# Patient Record
Sex: FEMALE | Race: WHITE
[De-identification: names, ages, dates, MRNs, and addresses within clinical notes are randomized per-mention and may not be internally consistent; named-entity substitution may affect disease eponyms.]

---

## 2017-12-06 ENCOUNTER — TRANSCRIPTION ENCOUNTER (OUTPATIENT)
Age: 82
End: 2017-12-06

## 2017-12-07 PROBLEM — Z00.00 ENCOUNTER FOR PREVENTIVE HEALTH EXAMINATION: Status: ACTIVE | Noted: 2017-12-07

## 2017-12-15 ENCOUNTER — APPOINTMENT (OUTPATIENT)
Dept: DERMATOLOGY | Facility: CLINIC | Age: 82
End: 2017-12-15
Payer: MEDICARE

## 2017-12-15 VITALS — HEIGHT: 63 IN | BODY MASS INDEX: 22.15 KG/M2 | WEIGHT: 125 LBS

## 2017-12-15 DIAGNOSIS — Z84.0 FAMILY HISTORY OF DISEASES OF THE SKIN AND SUBCUTANEOUS TISSUE: ICD-10-CM

## 2017-12-15 PROCEDURE — 99213 OFFICE O/P EST LOW 20 MIN: CPT

## 2018-12-12 ENCOUNTER — APPOINTMENT (OUTPATIENT)
Dept: DERMATOLOGY | Facility: CLINIC | Age: 83
End: 2018-12-12

## 2019-08-30 ENCOUNTER — APPOINTMENT (OUTPATIENT)
Dept: DERMATOLOGY | Facility: CLINIC | Age: 84
End: 2019-08-30
Payer: MEDICARE

## 2019-08-30 DIAGNOSIS — L40.9 PSORIASIS, UNSPECIFIED: ICD-10-CM

## 2019-08-30 DIAGNOSIS — L82.1 OTHER SEBORRHEIC KERATOSIS: ICD-10-CM

## 2019-08-30 PROCEDURE — 99213 OFFICE O/P EST LOW 20 MIN: CPT

## 2019-08-30 NOTE — HISTORY OF PRESENT ILLNESS
[de-identified] : Pt. presents for skin check;\par No itching, bleeding, growing, changing lesions noted; \par Also;  lifelong history of psoriasis;  currently doing well on topicals

## 2019-08-30 NOTE — ASSESSMENT
[FreeTextEntry1] : skin examination is negative for malignancy;\par Continue regular exams;\par \par Psoriasis doing well;  renew calcipotriene cream\par Follow up for TBSE in 1 year \par

## 2019-08-30 NOTE — PHYSICAL EXAM
[Full Body Skin Exam Performed] : performed [FreeTextEntry3] : Skin examination performed of the face, neck, chest, hands, lower legs;\par The patient is well, alert and oriented, pleasant and cooperative.\par Eyelids, conjunctivae, oral mucosa, digits and nails all normal.  \par No cervical adenopathy.\par \par  Normal findings include:\par \par Seborrheic keratoses\par Angiomas\par Lentigines\par \par No lesions were suspicious for malignancy. \par \par Erythematous scaling plaques with adherent scale; limited to elbows, also scalp

## 2019-12-25 ENCOUNTER — EMERGENCY (EMERGENCY)
Facility: HOSPITAL | Age: 84
LOS: 0 days | Discharge: HOME | End: 2019-12-25
Attending: EMERGENCY MEDICINE
Payer: MEDICARE

## 2019-12-25 VITALS
WEIGHT: 119.93 LBS | OXYGEN SATURATION: 98 % | DIASTOLIC BLOOD PRESSURE: 71 MMHG | TEMPERATURE: 98 F | SYSTOLIC BLOOD PRESSURE: 147 MMHG | RESPIRATION RATE: 17 BRPM | HEART RATE: 98 BPM | HEIGHT: 63 IN

## 2019-12-25 VITALS
DIASTOLIC BLOOD PRESSURE: 68 MMHG | OXYGEN SATURATION: 96 % | HEART RATE: 88 BPM | SYSTOLIC BLOOD PRESSURE: 146 MMHG | RESPIRATION RATE: 18 BRPM | TEMPERATURE: 98 F

## 2019-12-25 DIAGNOSIS — Z88.0 ALLERGY STATUS TO PENICILLIN: ICD-10-CM

## 2019-12-25 DIAGNOSIS — R06.02 SHORTNESS OF BREATH: ICD-10-CM

## 2019-12-25 DIAGNOSIS — R91.1 SOLITARY PULMONARY NODULE: ICD-10-CM

## 2019-12-25 DIAGNOSIS — Z79.01 LONG TERM (CURRENT) USE OF ANTICOAGULANTS: ICD-10-CM

## 2019-12-25 DIAGNOSIS — I25.10 ATHEROSCLEROTIC HEART DISEASE OF NATIVE CORONARY ARTERY WITHOUT ANGINA PECTORIS: ICD-10-CM

## 2019-12-25 DIAGNOSIS — J40 BRONCHITIS, NOT SPECIFIED AS ACUTE OR CHRONIC: ICD-10-CM

## 2019-12-25 DIAGNOSIS — Z85.3 PERSONAL HISTORY OF MALIGNANT NEOPLASM OF BREAST: ICD-10-CM

## 2019-12-25 DIAGNOSIS — Z88.1 ALLERGY STATUS TO OTHER ANTIBIOTIC AGENTS STATUS: ICD-10-CM

## 2019-12-25 DIAGNOSIS — I48.91 UNSPECIFIED ATRIAL FIBRILLATION: ICD-10-CM

## 2019-12-25 DIAGNOSIS — I10 ESSENTIAL (PRIMARY) HYPERTENSION: ICD-10-CM

## 2019-12-25 DIAGNOSIS — R05 COUGH: ICD-10-CM

## 2019-12-25 DIAGNOSIS — R06.2 WHEEZING: ICD-10-CM

## 2019-12-25 LAB
ALBUMIN SERPL ELPH-MCNC: 3.5 G/DL — SIGNIFICANT CHANGE UP (ref 3.3–5)
ALP SERPL-CCNC: 89 U/L — SIGNIFICANT CHANGE UP (ref 40–120)
ALT FLD-CCNC: 29 U/L — SIGNIFICANT CHANGE UP (ref 12–78)
ANION GAP SERPL CALC-SCNC: 7 MMOL/L — SIGNIFICANT CHANGE UP (ref 5–17)
APTT BLD: 68.2 SEC — HIGH (ref 27.5–36.3)
AST SERPL-CCNC: 28 U/L — SIGNIFICANT CHANGE UP (ref 15–37)
BASOPHILS # BLD AUTO: 0.05 K/UL — SIGNIFICANT CHANGE UP (ref 0–0.2)
BASOPHILS NFR BLD AUTO: 0.5 % — SIGNIFICANT CHANGE UP (ref 0–2)
BILIRUB SERPL-MCNC: 0.4 MG/DL — SIGNIFICANT CHANGE UP (ref 0.2–1.2)
BUN SERPL-MCNC: 14 MG/DL — SIGNIFICANT CHANGE UP (ref 7–23)
CALCIUM SERPL-MCNC: 9.2 MG/DL — SIGNIFICANT CHANGE UP (ref 8.5–10.1)
CHLORIDE SERPL-SCNC: 103 MMOL/L — SIGNIFICANT CHANGE UP (ref 96–108)
CO2 SERPL-SCNC: 26 MMOL/L — SIGNIFICANT CHANGE UP (ref 22–31)
CREAT SERPL-MCNC: 0.86 MG/DL — SIGNIFICANT CHANGE UP (ref 0.5–1.3)
EOSINOPHIL # BLD AUTO: 0.07 K/UL — SIGNIFICANT CHANGE UP (ref 0–0.5)
EOSINOPHIL NFR BLD AUTO: 0.7 % — SIGNIFICANT CHANGE UP (ref 0–6)
GLUCOSE SERPL-MCNC: 115 MG/DL — HIGH (ref 70–99)
HCT VFR BLD CALC: 42.8 % — SIGNIFICANT CHANGE UP (ref 34.5–45)
HGB BLD-MCNC: 13.8 G/DL — SIGNIFICANT CHANGE UP (ref 11.5–15.5)
IMM GRANULOCYTES NFR BLD AUTO: 0.3 % — SIGNIFICANT CHANGE UP (ref 0–1.5)
INR BLD: 2.5 RATIO — HIGH (ref 0.88–1.16)
LYMPHOCYTES # BLD AUTO: 1.12 K/UL — SIGNIFICANT CHANGE UP (ref 1–3.3)
LYMPHOCYTES # BLD AUTO: 11.6 % — LOW (ref 13–44)
MCHC RBC-ENTMCNC: 29.4 PG — SIGNIFICANT CHANGE UP (ref 27–34)
MCHC RBC-ENTMCNC: 32.2 GM/DL — SIGNIFICANT CHANGE UP (ref 32–36)
MCV RBC AUTO: 91.1 FL — SIGNIFICANT CHANGE UP (ref 80–100)
MONOCYTES # BLD AUTO: 1.15 K/UL — HIGH (ref 0–0.9)
MONOCYTES NFR BLD AUTO: 11.9 % — SIGNIFICANT CHANGE UP (ref 2–14)
NEUTROPHILS # BLD AUTO: 7.26 K/UL — SIGNIFICANT CHANGE UP (ref 1.8–7.4)
NEUTROPHILS NFR BLD AUTO: 75 % — SIGNIFICANT CHANGE UP (ref 43–77)
PLATELET # BLD AUTO: 273 K/UL — SIGNIFICANT CHANGE UP (ref 150–400)
POTASSIUM SERPL-MCNC: 4.3 MMOL/L — SIGNIFICANT CHANGE UP (ref 3.5–5.3)
POTASSIUM SERPL-SCNC: 4.3 MMOL/L — SIGNIFICANT CHANGE UP (ref 3.5–5.3)
PROT SERPL-MCNC: 7.2 GM/DL — SIGNIFICANT CHANGE UP (ref 6–8.3)
PROTHROM AB SERPL-ACNC: 28.5 SEC — HIGH (ref 10–12.9)
RAPID RVP RESULT: SIGNIFICANT CHANGE UP
RBC # BLD: 4.7 M/UL — SIGNIFICANT CHANGE UP (ref 3.8–5.2)
RBC # FLD: 13.5 % — SIGNIFICANT CHANGE UP (ref 10.3–14.5)
SODIUM SERPL-SCNC: 136 MMOL/L — SIGNIFICANT CHANGE UP (ref 135–145)
TROPONIN I SERPL-MCNC: <0.015 NG/ML — SIGNIFICANT CHANGE UP (ref 0.01–0.04)
WBC # BLD: 9.68 K/UL — SIGNIFICANT CHANGE UP (ref 3.8–10.5)
WBC # FLD AUTO: 9.68 K/UL — SIGNIFICANT CHANGE UP (ref 3.8–10.5)

## 2019-12-25 PROCEDURE — 71045 X-RAY EXAM CHEST 1 VIEW: CPT | Mod: 26

## 2019-12-25 PROCEDURE — 99284 EMERGENCY DEPT VISIT MOD MDM: CPT

## 2019-12-25 PROCEDURE — 85730 THROMBOPLASTIN TIME PARTIAL: CPT

## 2019-12-25 PROCEDURE — 94640 AIRWAY INHALATION TREATMENT: CPT

## 2019-12-25 PROCEDURE — 85025 COMPLETE CBC W/AUTO DIFF WBC: CPT

## 2019-12-25 PROCEDURE — 87633 RESP VIRUS 12-25 TARGETS: CPT

## 2019-12-25 PROCEDURE — 93010 ELECTROCARDIOGRAM REPORT: CPT

## 2019-12-25 PROCEDURE — 80053 COMPREHEN METABOLIC PANEL: CPT

## 2019-12-25 PROCEDURE — 93005 ELECTROCARDIOGRAM TRACING: CPT

## 2019-12-25 PROCEDURE — 87581 M.PNEUMON DNA AMP PROBE: CPT

## 2019-12-25 PROCEDURE — 84484 ASSAY OF TROPONIN QUANT: CPT

## 2019-12-25 PROCEDURE — 85610 PROTHROMBIN TIME: CPT

## 2019-12-25 PROCEDURE — 87798 DETECT AGENT NOS DNA AMP: CPT

## 2019-12-25 PROCEDURE — 36415 COLL VENOUS BLD VENIPUNCTURE: CPT

## 2019-12-25 PROCEDURE — 99284 EMERGENCY DEPT VISIT MOD MDM: CPT | Mod: 25

## 2019-12-25 PROCEDURE — 87486 CHLMYD PNEUM DNA AMP PROBE: CPT

## 2019-12-25 PROCEDURE — 71045 X-RAY EXAM CHEST 1 VIEW: CPT

## 2019-12-25 RX ORDER — IPRATROPIUM/ALBUTEROL SULFATE 18-103MCG
3 AEROSOL WITH ADAPTER (GRAM) INHALATION ONCE
Refills: 0 | Status: COMPLETED | OUTPATIENT
Start: 2019-12-25 | End: 2019-12-25

## 2019-12-25 RX ORDER — ALBUTEROL 90 UG/1
2 AEROSOL, METERED ORAL ONCE
Refills: 0 | Status: COMPLETED | OUTPATIENT
Start: 2019-12-25 | End: 2019-12-25

## 2019-12-25 RX ADMIN — Medication 3 MILLILITER(S): at 18:26

## 2019-12-25 RX ADMIN — ALBUTEROL 2 PUFF(S): 90 AEROSOL, METERED ORAL at 22:03

## 2019-12-25 NOTE — ED PROVIDER NOTE - OBJECTIVE STATEMENT
89 y/o female with PMHx of breast CA, CAD, Afib on Coumadin, HTN, hypercalcemia presents to the ED c/o SOB beginning this AM. +wheeze, congestion, productive cough. Denies fever, chills, vomiting, diarrhea, CP. Pt placed on O2 2L NC which offered relief. +sick contacts; spent time with granddaughter with croup last week.

## 2019-12-25 NOTE — ED PROVIDER NOTE - CLINICAL SUMMARY MEDICAL DECISION MAKING FREE TEXT BOX
Satting well on room air.  Mild wheeze/rhonchi.  Labs, Troponin, EKG, CXR appear normal.  Give neb treatement with improvement.  RVP pending.  Pt wanting discharge.  Will call them with results of RVP.  Otherwise symptomatic care, albuterol inhaler to be given at discharge.  F/u with PCP.

## 2019-12-25 NOTE — ED PROVIDER NOTE - NSFOLLOWUPINSTRUCTIONS_ED_ALL_ED_FT
Can use albuterol inhaler 2 puffs every 4-6 hours as needed for wheezing.     Acute Bronchitis    WHAT YOU NEED TO KNOW:    Acute bronchitis is swelling and irritation in the air passages of your lungs. This irritation may cause you to cough or have other breathing problems. Acute bronchitis often starts because of another illness, such as a cold or the flu. The illness spreads from your nose and throat to your windpipe and airways. Bronchitis is often called a chest cold. Acute bronchitis lasts about 3 to 6 weeks and is usually not a serious illness. Your cough can last for several weeks.     DISCHARGE INSTRUCTIONS:    Return to the emergency department if:     You cough up blood.      Your lips or fingernails turn blue.      You feel like you are not getting enough air when you breathe.    Contact your healthcare provider if:     You have a fever.      Your breathing problems do not go away or get worse.      Your cough does not get better within 4 weeks.      You have questions or concerns about your condition or care.    Self-care:     Get more rest. Rest helps your body to heal. Slowly start to do more each day. Rest when you feel it is needed.      Avoid irritants in the air. Avoid chemicals, fumes, and dust. Wear a face mask if you must work around dust or fumes. Stay inside on days when air pollution levels are high. If you have allergies, stay inside when pollen counts are high. Do not use aerosol products, such as spray-on deodorant, bug spray, and hair spray.      Do not smoke or be around others who smoke. Nicotine and other chemicals in cigarettes and cigars damages the cilia that move mucus out of your lungs. Ask your healthcare provider for information if you currently smoke and need help to quit. E-cigarettes or smokeless tobacco still contain nicotine. Talk to your healthcare provider before you use these products.       Drink liquids as directed. Liquids help keep your air passages moist and help you cough up mucus. You may need to drink more liquids when you have acute bronchitis. Ask how much liquid to drink each day and which liquids are best for you.      Use a humidifier or vaporizer. Use a cool mist humidifier or a vaporizer to increase air moisture in your home. This may make it easier for you to breathe and help decrease your cough.     Decrease risk for acute bronchitis:     Get the vaccinations you need. Ask your healthcare provider if you should get vaccinated against the flu or pneumonia.      Prevent the spread of germs. You can decrease your risk of acute bronchitis and other illnesses by doing the following:   Wash your hands often with soap and water. Carry germ-killing hand lotion or gel with you. You can use the lotion or gel to clean your hands when soap and water are not available.      Do not touch your eyes, nose, or mouth unless you have washed your hands first.      Always cover your mouth when you cough to prevent the spread of germs. It is best to cough into a tissue or your shirt sleeve instead of into your hand. Ask those around you cover their mouths when they cough.      Try to avoid people who have a cold or the flu. If you are sick, stay away from others as much as possible.    Medicines: Your healthcare provider may give you any of the following:     Ibuprofen or acetaminophen are medicines that help lower your fever. They are available without a doctor's order. Ask your healthcare provider which medicine is right for you. Ask how much to take and how often to take it. Follow directions. These medicines can cause stomach bleeding if not taken correctly. Ibuprofen can cause kidney damage. Do not take ibuprofen if you have kidney disease, an ulcer, or allergies to aspirin. Acetaminophen can cause liver damage. Do not take more than 4,000 milligrams in 24 hours.       Decongestants help loosen mucus in your lungs and make it easier to cough up. This can help you breathe easier.      Cough suppressants decrease your urge to cough. If your cough produces mucus, do not take a cough suppressant unless your healthcare provider tells you to. Your healthcare provider may suggest that you take a cough suppressant at night so you can rest.      Inhalers may be given. Your healthcare provider may give you one or more inhalers to help you breathe easier and cough less. An inhaler gives your medicine to open your airways. Ask your healthcare provider to show you how to use your inhaler correctly.Metered Dose Inhaler           Take your medicine as directed. Contact your healthcare provider if you think your medicine is not helping or if you have side effects. Tell him of her if you are allergic to any medicine. Keep a list of the medicines, vitamins, and herbs you take. Include the amounts, and when and why you take them. Bring the list or the pill bottles to follow-up visits. Carry your medicine list with you in case of an emergency.    Follow up with your healthcare provider as directed: Write down questions you have so you will remember to ask them during your follow-up visits

## 2019-12-25 NOTE — ED PROVIDER NOTE - PATIENT PORTAL LINK FT
You can access the FollowMyHealth Patient Portal offered by [facility] by registering at the following website: [email or web address]. By joining [employer]’s FollowMyHealth portal, you will also be able to view your health information using other applications (apps) compatible with our system.

## 2019-12-25 NOTE — ED ADULT NURSE NOTE - NSIMPLEMENTINTERV_GEN_ALL_ED
Implemented All Fall with Harm Risk Interventions:  [place] to call system. Call bell, personal items and telephone within reach. Instruct patient to call for assistance. Room bathroom lighting operational. Non-slip footwear when patient is off stretcher. Physically safe environment: no spills, clutter or unnecessary equipment. Stretcher in lowest position, wheels locked, appropriate side rails in place. Provide visual cue, wrist band, yellow gown, etc. Monitor gait and stability. Monitor for mental status changes and reorient to person, place, and time. Review medications for side effects contributing to fall risk. Reinforce activity limits and safety measures with patient and family. Provide visual clues: red socks.

## 2019-12-25 NOTE — ED ADULT TRIAGE NOTE - CHIEF COMPLAINT QUOTE
Patient brought in by EMS for shortness of breath and palpitations for 2 days. Patient given 1 sublingual nitroglycerin. Patient was 92% on room air and was hypertensive with EMS. Patient reports relief with oxygen.

## 2019-12-26 NOTE — ED POST DISCHARGE NOTE - RESULT SUMMARY
has L lung nodule, called & spoke to pt updated pt. expressed concern that this might be cancer & needs urgent follow up in ED or with PMD. pt states she's feeling better & would prefer to f/u with PMD

## 2020-01-23 PROBLEM — I48.91 UNSPECIFIED ATRIAL FIBRILLATION: Chronic | Status: ACTIVE | Noted: 2019-12-25

## 2020-01-23 PROBLEM — I10 ESSENTIAL (PRIMARY) HYPERTENSION: Chronic | Status: ACTIVE | Noted: 2019-12-25

## 2020-01-23 PROBLEM — C50.919 MALIGNANT NEOPLASM OF UNSPECIFIED SITE OF UNSPECIFIED FEMALE BREAST: Chronic | Status: ACTIVE | Noted: 2019-12-25

## 2020-01-24 ENCOUNTER — OUTPATIENT (OUTPATIENT)
Dept: OUTPATIENT SERVICES | Facility: HOSPITAL | Age: 85
LOS: 1 days | End: 2020-01-24
Payer: MEDICARE

## 2020-01-24 ENCOUNTER — APPOINTMENT (OUTPATIENT)
Dept: CT IMAGING | Facility: CLINIC | Age: 85
End: 2020-01-24
Payer: MEDICARE

## 2020-01-24 DIAGNOSIS — Z00.8 ENCOUNTER FOR OTHER GENERAL EXAMINATION: ICD-10-CM

## 2020-01-24 PROCEDURE — 71250 CT THORAX DX C-: CPT | Mod: 26

## 2020-01-24 PROCEDURE — 71250 CT THORAX DX C-: CPT

## 2020-02-03 ENCOUNTER — APPOINTMENT (OUTPATIENT)
Dept: INTERNAL MEDICINE | Facility: CLINIC | Age: 85
End: 2020-02-03
Payer: MEDICARE

## 2020-02-03 VITALS
HEIGHT: 62.5 IN | OXYGEN SATURATION: 97 % | HEART RATE: 78 BPM | SYSTOLIC BLOOD PRESSURE: 142 MMHG | RESPIRATION RATE: 18 BRPM | BODY MASS INDEX: 23.14 KG/M2 | TEMPERATURE: 97.1 F | DIASTOLIC BLOOD PRESSURE: 80 MMHG | WEIGHT: 129 LBS

## 2020-02-03 DIAGNOSIS — R93.89 ABNORMAL FINDINGS ON DIAGNOSTIC IMAGING OF OTHER SPECIFIED BODY STRUCTURES: ICD-10-CM

## 2020-02-03 DIAGNOSIS — Z87.898 PERSONAL HISTORY OF OTHER SPECIFIED CONDITIONS: ICD-10-CM

## 2020-02-03 DIAGNOSIS — I49.3 VENTRICULAR PREMATURE DEPOLARIZATION: ICD-10-CM

## 2020-02-03 DIAGNOSIS — I48.0 PAROXYSMAL ATRIAL FIBRILLATION: ICD-10-CM

## 2020-02-03 DIAGNOSIS — Z86.79 PERSONAL HISTORY OF OTHER DISEASES OF THE CIRCULATORY SYSTEM: ICD-10-CM

## 2020-02-03 PROCEDURE — 94010 BREATHING CAPACITY TEST: CPT

## 2020-02-03 PROCEDURE — 94729 DIFFUSING CAPACITY: CPT

## 2020-02-03 PROCEDURE — ZZZZZ: CPT

## 2020-02-03 PROCEDURE — 94727 GAS DIL/WSHOT DETER LNG VOL: CPT

## 2020-02-03 PROCEDURE — 99204 OFFICE O/P NEW MOD 45 MIN: CPT | Mod: 25

## 2020-02-03 NOTE — HISTORY OF PRESENT ILLNESS
[TextBox_4] : This is a pleasant 89-year-old female who is here with her daughter today for her first pulmonary visit.  She has a history of atrial fibrillation, hypertension, mitral regurgitation, left breast cancer and status post L mastectomy 40 years ago, rectal bleed 5/2015 probably diverticular, and PVC's. She has a history of a high serum calcium in October, 2019 which returned to normal on Sensipar.\par \par She was seen in the emergency room on December 5 with bronchitis and shortness of breath and was treated with albuterol nebulizer. Chest x-ray showed a opacity in the medial left upper lung field and subsequently had a CT scan of chest.\par \par She is noting some dyspnea on exertion, but is able to walk through the supermarket and climbing up a 1/2 flights all right. She CT scan of Chest which showed a 3 cm medial left upper lobe mass, as well as 2 mixed groundglass and solid opacities in the left upper lobe. she denies cough, production, wheezing, or hemoptysis. She is a nonsmoker. She denies recent fever, chills, sweats, poor appetite, or weight loss.

## 2020-02-03 NOTE — REVIEW OF SYSTEMS
[Negative] : Endocrine [Chills] : no chills [Fever] : no fever [Poor Appetite] : no poor appetite [Recent Wt Loss (___ Lbs)] : ~T no recent weight loss [TextBox_30] : see above [TextBox_44] : see above

## 2020-02-03 NOTE — ASSESSMENT
[FreeTextEntry1] : #1 abnormal CT scan of chest as described above. Medial left upper lobe density seen on recent chest x-ray of Dec, 2019, but not seen on chest x-ray of 2015.  Differential diagnosis includes consider tumor, benign versus malignant, latter including both the lung primary and possible metastatic disease. Other possibilities include scarring, inflammatory or post inflammatory, infectious or postinfectious. Consider 2 smaller mixed groundglass/solid lesions in left upper lobe as possible adenocarcinoma in situ.  We'll discuss possible biopsy procedures with interventional radiology, possible transthoracic needle aspiration biopsy, and thoracic surgery, possible navigational bronchoscopy.  Consider for PET/CT scan, although patient does not want to have at this point.\par \par #2 AF. On coumadin.\par \par #3 primary hyperparathyroidism. \par \par #4 HTN.\par \par #5 Mitral Regurgitation.\par \par #6 remote h.o. breast cancer.\par \par

## 2020-02-03 NOTE — PHYSICAL EXAM
[No Acute Distress] : no acute distress [Normal Oropharynx] : normal oropharynx [Normal Appearance] : normal appearance [No Neck Mass] : no neck mass [Normal S1, S2] : normal s1, s2 [No Resp Distress] : no resp distress [Clear to Auscultation Bilaterally] : clear to auscultation bilaterally [Benign] : benign [No Clubbing] : no clubbing [No Cyanosis] : no cyanosis [No Edema] : no edema [Normal Color/ Pigmentation] : normal color/ pigmentation [No Focal Deficits] : no focal deficits [Oriented x3] : oriented x3 [Normal Insight/judgment] : normal insight/judgment [Normal Affect] : normal affect [TextBox_54] : HR 74, 2/6 gabe hernandez [TextBox_68] : decreased aud BS's bilat.

## 2020-02-03 NOTE — PROCEDURE
[FreeTextEntry1] : Full pulmonary function testing today shows a moderate to severe restrictive and obstructed deficit with an FEV1 of 0.58 or 43% predicted. RV/TLC is increased consistent with air trapping. Diffusing capacity is normal.

## 2020-12-25 ENCOUNTER — INPATIENT (INPATIENT)
Facility: HOSPITAL | Age: 85
LOS: 2 days | Discharge: HOME | DRG: 393 | End: 2020-12-28
Attending: INTERNAL MEDICINE | Admitting: INTERNAL MEDICINE
Payer: MEDICARE

## 2020-12-25 VITALS — HEIGHT: 63 IN

## 2020-12-25 DIAGNOSIS — K62.5 HEMORRHAGE OF ANUS AND RECTUM: ICD-10-CM

## 2020-12-25 LAB
ALBUMIN SERPL ELPH-MCNC: 3.3 G/DL — SIGNIFICANT CHANGE UP (ref 3.3–5)
ALP SERPL-CCNC: 81 U/L — SIGNIFICANT CHANGE UP (ref 40–120)
ALT FLD-CCNC: 26 U/L — SIGNIFICANT CHANGE UP (ref 12–78)
ANION GAP SERPL CALC-SCNC: 5 MMOL/L — SIGNIFICANT CHANGE UP (ref 5–17)
APPEARANCE UR: ABNORMAL
APTT BLD: 92.5 SEC — HIGH (ref 27.5–35.5)
AST SERPL-CCNC: 20 U/L — SIGNIFICANT CHANGE UP (ref 15–37)
BASOPHILS # BLD AUTO: 0.05 K/UL — SIGNIFICANT CHANGE UP (ref 0–0.2)
BASOPHILS NFR BLD AUTO: 0.7 % — SIGNIFICANT CHANGE UP (ref 0–2)
BILIRUB SERPL-MCNC: 0.5 MG/DL — SIGNIFICANT CHANGE UP (ref 0.2–1.2)
BILIRUB UR-MCNC: NEGATIVE — SIGNIFICANT CHANGE UP
BUN SERPL-MCNC: 34 MG/DL — HIGH (ref 7–23)
CALCIUM SERPL-MCNC: 9.9 MG/DL — SIGNIFICANT CHANGE UP (ref 8.5–10.1)
CHLORIDE SERPL-SCNC: 104 MMOL/L — SIGNIFICANT CHANGE UP (ref 96–108)
CO2 SERPL-SCNC: 32 MMOL/L — HIGH (ref 22–31)
COLOR SPEC: ABNORMAL
CREAT SERPL-MCNC: 1.57 MG/DL — HIGH (ref 0.5–1.3)
DIFF PNL FLD: ABNORMAL
EOSINOPHIL # BLD AUTO: 0.07 K/UL — SIGNIFICANT CHANGE UP (ref 0–0.5)
EOSINOPHIL NFR BLD AUTO: 0.9 % — SIGNIFICANT CHANGE UP (ref 0–6)
GLUCOSE SERPL-MCNC: 100 MG/DL — HIGH (ref 70–99)
GLUCOSE UR QL: NEGATIVE MG/DL — SIGNIFICANT CHANGE UP
HCT VFR BLD CALC: 37 % — SIGNIFICANT CHANGE UP (ref 34.5–45)
HGB BLD-MCNC: 11.9 G/DL — SIGNIFICANT CHANGE UP (ref 11.5–15.5)
IMM GRANULOCYTES NFR BLD AUTO: 0.7 % — SIGNIFICANT CHANGE UP (ref 0–1.5)
INR BLD: 2.52 RATIO — HIGH (ref 0.88–1.16)
KETONES UR-MCNC: ABNORMAL
LEUKOCYTE ESTERASE UR-ACNC: ABNORMAL
LYMPHOCYTES # BLD AUTO: 0.73 K/UL — LOW (ref 1–3.3)
LYMPHOCYTES # BLD AUTO: 9.9 % — LOW (ref 13–44)
MCHC RBC-ENTMCNC: 29.7 PG — SIGNIFICANT CHANGE UP (ref 27–34)
MCHC RBC-ENTMCNC: 32.2 GM/DL — SIGNIFICANT CHANGE UP (ref 32–36)
MCV RBC AUTO: 92.3 FL — SIGNIFICANT CHANGE UP (ref 80–100)
MONOCYTES # BLD AUTO: 0.76 K/UL — SIGNIFICANT CHANGE UP (ref 0–0.9)
MONOCYTES NFR BLD AUTO: 10.3 % — SIGNIFICANT CHANGE UP (ref 2–14)
NEUTROPHILS # BLD AUTO: 5.72 K/UL — SIGNIFICANT CHANGE UP (ref 1.8–7.4)
NEUTROPHILS NFR BLD AUTO: 77.5 % — HIGH (ref 43–77)
NITRITE UR-MCNC: POSITIVE
PH UR: 8 — SIGNIFICANT CHANGE UP (ref 5–8)
PLATELET # BLD AUTO: 289 K/UL — SIGNIFICANT CHANGE UP (ref 150–400)
POTASSIUM SERPL-MCNC: 5 MMOL/L — SIGNIFICANT CHANGE UP (ref 3.5–5.3)
POTASSIUM SERPL-SCNC: 5 MMOL/L — SIGNIFICANT CHANGE UP (ref 3.5–5.3)
PROT SERPL-MCNC: 7 GM/DL — SIGNIFICANT CHANGE UP (ref 6–8.3)
PROT UR-MCNC: 100 MG/DL
PROTHROM AB SERPL-ACNC: 28.2 SEC — HIGH (ref 10.6–13.6)
RBC # BLD: 4.01 M/UL — SIGNIFICANT CHANGE UP (ref 3.8–5.2)
RBC # FLD: 12.8 % — SIGNIFICANT CHANGE UP (ref 10.3–14.5)
SARS-COV-2 RNA SPEC QL NAA+PROBE: SIGNIFICANT CHANGE UP
SODIUM SERPL-SCNC: 141 MMOL/L — SIGNIFICANT CHANGE UP (ref 135–145)
SP GR SPEC: 1.01 — SIGNIFICANT CHANGE UP (ref 1.01–1.02)
UROBILINOGEN FLD QL: 1 MG/DL
WBC # BLD: 7.38 K/UL — SIGNIFICANT CHANGE UP (ref 3.8–10.5)
WBC # FLD AUTO: 7.38 K/UL — SIGNIFICANT CHANGE UP (ref 3.8–10.5)

## 2020-12-25 PROCEDURE — 71045 X-RAY EXAM CHEST 1 VIEW: CPT

## 2020-12-25 PROCEDURE — 81001 URINALYSIS AUTO W/SCOPE: CPT

## 2020-12-25 PROCEDURE — 71045 X-RAY EXAM CHEST 1 VIEW: CPT | Mod: 26

## 2020-12-25 PROCEDURE — 80048 BASIC METABOLIC PNL TOTAL CA: CPT

## 2020-12-25 PROCEDURE — 99223 1ST HOSP IP/OBS HIGH 75: CPT

## 2020-12-25 PROCEDURE — 36415 COLL VENOUS BLD VENIPUNCTURE: CPT

## 2020-12-25 PROCEDURE — 85027 COMPLETE CBC AUTOMATED: CPT

## 2020-12-25 PROCEDURE — 85610 PROTHROMBIN TIME: CPT

## 2020-12-25 PROCEDURE — 93010 ELECTROCARDIOGRAM REPORT: CPT

## 2020-12-25 PROCEDURE — 87086 URINE CULTURE/COLONY COUNT: CPT

## 2020-12-25 RX ORDER — PHYTONADIONE (VIT K1) 5 MG
2.5 TABLET ORAL ONCE
Refills: 0 | Status: COMPLETED | OUTPATIENT
Start: 2020-12-25 | End: 2020-12-25

## 2020-12-25 RX ORDER — AMLODIPINE BESYLATE 2.5 MG/1
2.5 TABLET ORAL DAILY
Refills: 0 | Status: DISCONTINUED | OUTPATIENT
Start: 2020-12-25 | End: 2020-12-28

## 2020-12-25 RX ORDER — METOPROLOL TARTRATE 50 MG
100 TABLET ORAL AT BEDTIME
Refills: 0 | Status: DISCONTINUED | OUTPATIENT
Start: 2020-12-25 | End: 2020-12-28

## 2020-12-25 RX ORDER — AMLODIPINE BESYLATE 2.5 MG/1
2.5 TABLET ORAL ONCE
Refills: 0 | Status: COMPLETED | OUTPATIENT
Start: 2020-12-25 | End: 2020-12-25

## 2020-12-25 RX ORDER — ACETAMINOPHEN 500 MG
650 TABLET ORAL EVERY 6 HOURS
Refills: 0 | Status: DISCONTINUED | OUTPATIENT
Start: 2020-12-25 | End: 2020-12-28

## 2020-12-25 RX ORDER — METOPROLOL TARTRATE 50 MG
50 TABLET ORAL DAILY
Refills: 0 | Status: DISCONTINUED | OUTPATIENT
Start: 2020-12-25 | End: 2020-12-27

## 2020-12-25 RX ORDER — ONDANSETRON 8 MG/1
4 TABLET, FILM COATED ORAL EVERY 6 HOURS
Refills: 0 | Status: DISCONTINUED | OUTPATIENT
Start: 2020-12-25 | End: 2020-12-28

## 2020-12-25 RX ORDER — SODIUM CHLORIDE 9 MG/ML
1000 INJECTION INTRAMUSCULAR; INTRAVENOUS; SUBCUTANEOUS
Refills: 0 | Status: DISCONTINUED | OUTPATIENT
Start: 2020-12-25 | End: 2020-12-26

## 2020-12-25 RX ORDER — METOPROLOL TARTRATE 50 MG
50 TABLET ORAL ONCE
Refills: 0 | Status: COMPLETED | OUTPATIENT
Start: 2020-12-25 | End: 2020-12-25

## 2020-12-25 RX ADMIN — Medication 50 MILLIGRAM(S): at 13:06

## 2020-12-25 RX ADMIN — Medication 100 MILLIGRAM(S): at 21:49

## 2020-12-25 RX ADMIN — Medication 2.5 MILLIGRAM(S): at 11:36

## 2020-12-25 RX ADMIN — AMLODIPINE BESYLATE 2.5 MILLIGRAM(S): 2.5 TABLET ORAL at 13:03

## 2020-12-25 RX ADMIN — SODIUM CHLORIDE 80 MILLILITER(S): 9 INJECTION INTRAMUSCULAR; INTRAVENOUS; SUBCUTANEOUS at 14:43

## 2020-12-25 NOTE — H&P ADULT - ASSESSMENT
* Hematochezia most likely sec to diverticula bleed  per pt large amount  reverse INR  GI consult  case d/w daughter  serial CBCs    * AF  resume BB and hold VKA    * Potassium 5, creatinine 1.57  repeat in am  hold Lasix for now    * MOLST DNR DNI    Dr. Botello aware ( cardio and PCP)

## 2020-12-25 NOTE — ED PROVIDER NOTE - OBJECTIVE STATEMENT
89 y/o female with a PMHx of Afib on Coumadin, HTN, Breast CA, Lung CA, CAD BIBA for rectal bleeding x three days. Pt states that she has been bleeding from her bottom for the past three days. Pt states that yesterday she was trying to make a BM, when a lot of blood came out from her bottom. States the bleeding became worse during the night. Daughter got concerned so she called EMS this morning. Denies abd pain. 89 y/o female with a PMHx of Afib on Coumadin, HTN, Breast CA, Lung CA, CAD BIBA for rectal bleeding x three days. Pt states that she has been bleeding from her bottom for the past three days. Pt states that yesterday she was trying to make a BM, when a lot of blood came out from her bottom. States the bleeding became worse during the night. Daughter got concerned so she called EMS this morning. Denies abd pain. PCP: Rosmery 89 y/o female with a PMHx of Afib on Coumadin, HTN, Breast CA, Lung CA, CAD BIBA for rectal bleeding x three days. Pt states that she has been bleeding from her bottom for the past three days. Pt states that yesterday she was trying to make a BM, when a lot of blood came out from her bottom. States the bleeding became worse during the night. Daughter got concerned so she called EMS this morning. Denies abd pain. Last colonoscopy was 2 years ago, pt states was normal. Oncologist: Bhupendra. PCP: Rosmery 89 y/o female with a PMHx of Afib on Coumadin, HTN, Breast CA, Lung CA, CAD BIBA for rectal bleeding x three days. Pt states that she has been bleeding from her rectum for the past three days. Pt states that yesterday she was trying to have a BM, when a lot of blood "came out" per patient; States the bleeding became worse during the night. Daughter got concerned so she called EMS this morning. Denies abd pain. Last colonoscopy was 2 years ago, pt states was normal. No nausea or vomiting; no dizziness or lightheadedness; no chest pain; no sob; Oncologist: Stanford. PCP: Rosmery

## 2020-12-25 NOTE — ED PROVIDER NOTE - PROGRESS NOTE DETAILS
Scribe AS for Dr. Damon: Stool guaiac performed by Dr. Damon. Expiration: 2/28/21. Lot #: [id number]; Result: BRBPR positive ; QC- reactive. Marisol DO: patient with junctional rhythm vs. afib with pvcs, tele bed placed at this time.

## 2020-12-25 NOTE — ED ADULT NURSE NOTE - NSIMPLEMENTINTERV_GEN_ALL_ED
Implemented All Fall with Harm Risk Interventions:  [place] to call system. Call bell, personal items and telephone within reach. Instruct patient to call for assistance. Room bathroom lighting operational. Non-slip footwear when patient is off stretcher. Physically safe environment: no spills, clutter or unnecessary equipment. Stretcher in lowest position, wheels locked, appropriate side rails in place. Provide visual cue, wrist band, yellow gown, etc. Monitor gait and stability. Monitor for mental status changes and reorient to person, place, and time. Review medications for side effects contributing to fall risk. Reinforce activity limits and safety measures with patient and family. Provide visual clues: red socks.

## 2020-12-25 NOTE — ED ADULT NURSE NOTE - PRO INTERPRETER NEED 2
Patient is here for right ankle pain . He rolled his right ankle at work in 9/18 . It swelled . He iced it and took Ibuprofen. He was running prior to the injury . He ran the [address] in 5/18 without problems. He took the summer off and then injured right ankle and has struggled to start running again. He denies limping. At times the right ankle flares for < 24 hours. At times certain sitting positions on the floor and prolonged standing flare the pain   He did Thanksgiving run 10 K and did okay . He last ran a couple of months ago - 3-4 miles with daughter and her friends. Right ankle flares sporadically . Usually activity seems to flare it , including yard work or squatting. Pain is 1-2/10. Rarely takes NSAIDs . Vasquez Asher Review of Systems    ROS: All other systems were reviewed and are negative . Patient's allergies and medications were reviewed. Patient's past medical, surgical, social , and family history were reviewed. OBJECTIVE:  /73   Pulse 68   Temp 97 °F (36.1 °C) (Oral)   Resp 12   Wt 182 lb 6.4 oz (82.7 kg)   SpO2 99%   BMI 24.06 kg/m²     Physical Exam    General: NAD, cooperative, alert and oriented X 3. Mood / affect is good. good insight. well hydrated. Neck : no lymphadenopathy, supple, FROM  CV: Regular rate and rhythm , no murmurs/ rub/ gallop. No edema. Lungs : CTA bilaterally, breathing comfortably  Abdomen: positive bowel sounds, soft , non tender, non distended. No hepatosplenomegaly. No CVA tenderness. Right ankle: slight edema to lateral ankle. Minimal tenderness to right lateral ankle. FROM. Strength 5/5. DTRs 2+ achilles bilateral.  Skin: no rashes. Non tender. ASSESSMENT/  PLAN:  1. Acute right ankle pain  - Moist heat . ROM exercises- handout given. Modify activities. - Aleve 1-2 po bid prn.   - recommended good supportive footwear. - hold on imaging / Physical Therapy given minimal symptoms today .    - advised to start with low impact exercise - elliptical, bike and gradually increase over weeks. English

## 2020-12-25 NOTE — H&P ADULT - HISTORY OF PRESENT ILLNESS
89 y/o female with a PMHx of Afib on Coumadin, HTN, Breast CA, Lung CA, CAD BIBA for rectal bleeding x three days. Pt states that she has been bleeding from her rectum for the past three days. Pt states that yesterday she was trying to have a BM, when a lot of blood "came out" per patient; States the bleeding became worse during the night. Daughter got concerned so she called EMS this morning. Denies abd pain. Last colonoscopy was 2 years ago, pt states was normal. No nausea or vomiting; no dizziness or lightheadedness; no chest pain; no sob; Oncologist: Stanford- finished RXT for lung cancer with good results.

## 2020-12-25 NOTE — H&P ADULT - NSHPPHYSICALEXAM_GEN_ALL_CORE
· CONSTITUTIONAL: Well appearing, awake, alert, oriented to person, place, time/situation and in no apparent distress.  · ENMT: Airway patent, Nasal mucosa clear. Mouth with normal mucosa. Throat has no vesicles, no oropharyngeal exudates and uvula is midline.  · EYES: Clear bilaterally, pupils equal, round and reactive to light.  · CARDIAC: irregular rhythm.  Heart sounds S1, S2.  No murmurs, rubs or gallops.  · RESPIRATORY: Breath sounds clear and equal bilaterally.  · GASTROINTESTINAL: Abdomen soft, non-tender, no guarding. +Rectal: BRBPR  · MUSCULOSKELETAL: Spine appears normal, range of motion is not limited, no muscle or joint tenderness  · NEUROLOGICAL: Alert and oriented, no focal deficits, no motor or sensory deficits.  · SKIN: Skin normal color for race, warm, dry and intact. No evidence of rash.

## 2020-12-25 NOTE — H&P ADULT - NSHPLABSRESULTS_GEN_ALL_CORE
11.9   7.38  )-----------( 289      ( 25 Dec 2020 09:57 )             37.0   12-25    141  |  104  |  34<H>  ----------------------------<  100<H>  5.0   |  32<H>  |  1.57<H>    Ca    9.9      25 Dec 2020 09:57    TPro  7.0  /  Alb  3.3  /  TBili  0.5  /  DBili  x   /  AST  20  /  ALT  26  /  AlkPhos  81  12-25

## 2020-12-25 NOTE — ED ADULT NURSE NOTE - OBJECTIVE STATEMENT
BIBA s/p 3 episodes of dark bloody stool per pt. Pt denies dizziness, SOB or pain at this time. Last BM 7AM today, pt on coumadin. Hx Lung CA, L mastectomy, states DNR status.

## 2020-12-26 LAB
ANION GAP SERPL CALC-SCNC: 2 MMOL/L — LOW (ref 5–17)
BUN SERPL-MCNC: 29 MG/DL — HIGH (ref 7–23)
CALCIUM SERPL-MCNC: 9.2 MG/DL — SIGNIFICANT CHANGE UP (ref 8.5–10.1)
CHLORIDE SERPL-SCNC: 111 MMOL/L — HIGH (ref 96–108)
CO2 SERPL-SCNC: 31 MMOL/L — SIGNIFICANT CHANGE UP (ref 22–31)
CREAT SERPL-MCNC: 1.22 MG/DL — SIGNIFICANT CHANGE UP (ref 0.5–1.3)
CULTURE RESULTS: SIGNIFICANT CHANGE UP
GLUCOSE SERPL-MCNC: 97 MG/DL — SIGNIFICANT CHANGE UP (ref 70–99)
HCT VFR BLD CALC: 33 % — LOW (ref 34.5–45)
HGB BLD-MCNC: 10.2 G/DL — LOW (ref 11.5–15.5)
INR BLD: 1.71 RATIO — HIGH (ref 0.88–1.16)
MCHC RBC-ENTMCNC: 29.3 PG — SIGNIFICANT CHANGE UP (ref 27–34)
MCHC RBC-ENTMCNC: 30.9 GM/DL — LOW (ref 32–36)
MCV RBC AUTO: 94.8 FL — SIGNIFICANT CHANGE UP (ref 80–100)
PLATELET # BLD AUTO: 259 K/UL — SIGNIFICANT CHANGE UP (ref 150–400)
POTASSIUM SERPL-MCNC: 4.4 MMOL/L — SIGNIFICANT CHANGE UP (ref 3.5–5.3)
POTASSIUM SERPL-SCNC: 4.4 MMOL/L — SIGNIFICANT CHANGE UP (ref 3.5–5.3)
PROTHROM AB SERPL-ACNC: 19.3 SEC — HIGH (ref 10.6–13.6)
RBC # BLD: 3.48 M/UL — LOW (ref 3.8–5.2)
RBC # FLD: 12.7 % — SIGNIFICANT CHANGE UP (ref 10.3–14.5)
SARS-COV-2 IGG SERPL QL IA: NEGATIVE — SIGNIFICANT CHANGE UP
SARS-COV-2 IGM SERPL IA-ACNC: 0.28 INDEX — SIGNIFICANT CHANGE UP
SODIUM SERPL-SCNC: 144 MMOL/L — SIGNIFICANT CHANGE UP (ref 135–145)
SPECIMEN SOURCE: SIGNIFICANT CHANGE UP
WBC # BLD: 7.1 K/UL — SIGNIFICANT CHANGE UP (ref 3.8–10.5)
WBC # FLD AUTO: 7.1 K/UL — SIGNIFICANT CHANGE UP (ref 3.8–10.5)

## 2020-12-26 PROCEDURE — 99232 SBSQ HOSP IP/OBS MODERATE 35: CPT

## 2020-12-26 RX ADMIN — Medication 100 MILLIGRAM(S): at 21:46

## 2020-12-26 RX ADMIN — AMLODIPINE BESYLATE 2.5 MILLIGRAM(S): 2.5 TABLET ORAL at 11:33

## 2020-12-26 RX ADMIN — SODIUM CHLORIDE 80 MILLILITER(S): 9 INJECTION INTRAMUSCULAR; INTRAVENOUS; SUBCUTANEOUS at 14:23

## 2020-12-26 RX ADMIN — Medication 50 MILLIGRAM(S): at 11:34

## 2020-12-26 NOTE — PROVIDER CONTACT NOTE (OTHER) - SITUATION
Consult called 12/25/20 at 1302, pt. not seen yet.   Dr. Steele asked for consult to be called in again.

## 2020-12-27 LAB
ANION GAP SERPL CALC-SCNC: 4 MMOL/L — LOW (ref 5–17)
BUN SERPL-MCNC: 28 MG/DL — HIGH (ref 7–23)
CALCIUM SERPL-MCNC: 9.8 MG/DL — SIGNIFICANT CHANGE UP (ref 8.5–10.1)
CHLORIDE SERPL-SCNC: 110 MMOL/L — HIGH (ref 96–108)
CO2 SERPL-SCNC: 29 MMOL/L — SIGNIFICANT CHANGE UP (ref 22–31)
CREAT SERPL-MCNC: 1 MG/DL — SIGNIFICANT CHANGE UP (ref 0.5–1.3)
GLUCOSE SERPL-MCNC: 108 MG/DL — HIGH (ref 70–99)
HCT VFR BLD CALC: 32.1 % — LOW (ref 34.5–45)
HGB BLD-MCNC: 10.2 G/DL — LOW (ref 11.5–15.5)
INR BLD: 1.34 RATIO — HIGH (ref 0.88–1.16)
MCHC RBC-ENTMCNC: 29.4 PG — SIGNIFICANT CHANGE UP (ref 27–34)
MCHC RBC-ENTMCNC: 31.8 GM/DL — LOW (ref 32–36)
MCV RBC AUTO: 92.5 FL — SIGNIFICANT CHANGE UP (ref 80–100)
PLATELET # BLD AUTO: 266 K/UL — SIGNIFICANT CHANGE UP (ref 150–400)
POTASSIUM SERPL-MCNC: 4.7 MMOL/L — SIGNIFICANT CHANGE UP (ref 3.5–5.3)
POTASSIUM SERPL-SCNC: 4.7 MMOL/L — SIGNIFICANT CHANGE UP (ref 3.5–5.3)
PROTHROM AB SERPL-ACNC: 15.5 SEC — HIGH (ref 10.6–13.6)
RBC # BLD: 3.47 M/UL — LOW (ref 3.8–5.2)
RBC # FLD: 12.8 % — SIGNIFICANT CHANGE UP (ref 10.3–14.5)
SODIUM SERPL-SCNC: 143 MMOL/L — SIGNIFICANT CHANGE UP (ref 135–145)
WBC # BLD: 8.23 K/UL — SIGNIFICANT CHANGE UP (ref 3.8–10.5)
WBC # FLD AUTO: 8.23 K/UL — SIGNIFICANT CHANGE UP (ref 3.8–10.5)

## 2020-12-27 PROCEDURE — 99232 SBSQ HOSP IP/OBS MODERATE 35: CPT

## 2020-12-27 RX ORDER — FUROSEMIDE 40 MG
20 TABLET ORAL DAILY
Refills: 0 | Status: DISCONTINUED | OUTPATIENT
Start: 2020-12-27 | End: 2020-12-28

## 2020-12-27 RX ORDER — WARFARIN SODIUM 2.5 MG/1
6 TABLET ORAL AT BEDTIME
Refills: 0 | Status: DISCONTINUED | OUTPATIENT
Start: 2020-12-27 | End: 2020-12-28

## 2020-12-27 RX ADMIN — Medication 20 MILLIGRAM(S): at 12:30

## 2020-12-27 RX ADMIN — Medication 100 MILLIGRAM(S): at 20:09

## 2020-12-27 RX ADMIN — WARFARIN SODIUM 6 MILLIGRAM(S): 2.5 TABLET ORAL at 20:10

## 2020-12-27 RX ADMIN — AMLODIPINE BESYLATE 2.5 MILLIGRAM(S): 2.5 TABLET ORAL at 09:23

## 2020-12-27 RX ADMIN — Medication 50 MILLIGRAM(S): at 09:23

## 2020-12-27 NOTE — CONSULT NOTE ADULT - ASSESSMENT
pt states that her colonoscopy was negative.  However, she also states colonoscopy was done at the hospital although Dr. Mackey has not been at [facility] in more than 2 years.  Patient states that if she has to have another colonoscopy, she would like to have it done with the same doctor.    On rectal examination, she has brown stools which are firm and also has hemorrhoids.    Likely bleeding secondary to hemorrhoids.  Has a mild decrease in hematocrit but I am unsure what her baseline is at.    Option of colonoscopy reviewed and she would like to consider that as an outpatient.    Serial H&H's, clinical follow-up.    Risk of holding anticoagulation with atrial fibrillation discussed with patient and understanding risks, she would like to resume anticoagulation.          if HCT stable, then DC planning

## 2020-12-28 ENCOUNTER — TRANSCRIPTION ENCOUNTER (OUTPATIENT)
Age: 85
End: 2020-12-28

## 2020-12-28 VITALS
DIASTOLIC BLOOD PRESSURE: 73 MMHG | HEART RATE: 50 BPM | SYSTOLIC BLOOD PRESSURE: 133 MMHG | RESPIRATION RATE: 18 BRPM | TEMPERATURE: 97 F | OXYGEN SATURATION: 98 %

## 2020-12-28 LAB
ANION GAP SERPL CALC-SCNC: 4 MMOL/L — LOW (ref 5–17)
BUN SERPL-MCNC: 28 MG/DL — HIGH (ref 7–23)
CALCIUM SERPL-MCNC: 9.9 MG/DL — SIGNIFICANT CHANGE UP (ref 8.5–10.1)
CHLORIDE SERPL-SCNC: 106 MMOL/L — SIGNIFICANT CHANGE UP (ref 96–108)
CO2 SERPL-SCNC: 31 MMOL/L — SIGNIFICANT CHANGE UP (ref 22–31)
CREAT SERPL-MCNC: 1.11 MG/DL — SIGNIFICANT CHANGE UP (ref 0.5–1.3)
GLUCOSE SERPL-MCNC: 89 MG/DL — SIGNIFICANT CHANGE UP (ref 70–99)
HCT VFR BLD CALC: 32.2 % — LOW (ref 34.5–45)
HGB BLD-MCNC: 10.2 G/DL — LOW (ref 11.5–15.5)
INR BLD: 1.29 RATIO — HIGH (ref 0.88–1.16)
MCHC RBC-ENTMCNC: 29.5 PG — SIGNIFICANT CHANGE UP (ref 27–34)
MCHC RBC-ENTMCNC: 31.7 GM/DL — LOW (ref 32–36)
MCV RBC AUTO: 93.1 FL — SIGNIFICANT CHANGE UP (ref 80–100)
PLATELET # BLD AUTO: 254 K/UL — SIGNIFICANT CHANGE UP (ref 150–400)
POTASSIUM SERPL-MCNC: 4.3 MMOL/L — SIGNIFICANT CHANGE UP (ref 3.5–5.3)
POTASSIUM SERPL-SCNC: 4.3 MMOL/L — SIGNIFICANT CHANGE UP (ref 3.5–5.3)
PROTHROM AB SERPL-ACNC: 14.8 SEC — HIGH (ref 10.6–13.6)
RBC # BLD: 3.46 M/UL — LOW (ref 3.8–5.2)
RBC # FLD: 12.9 % — SIGNIFICANT CHANGE UP (ref 10.3–14.5)
SODIUM SERPL-SCNC: 141 MMOL/L — SIGNIFICANT CHANGE UP (ref 135–145)
WBC # BLD: 6.98 K/UL — SIGNIFICANT CHANGE UP (ref 3.8–10.5)
WBC # FLD AUTO: 6.98 K/UL — SIGNIFICANT CHANGE UP (ref 3.8–10.5)

## 2020-12-28 PROCEDURE — 99239 HOSP IP/OBS DSCHRG MGMT >30: CPT

## 2020-12-28 RX ORDER — WARFARIN SODIUM 2.5 MG/1
1 TABLET ORAL
Qty: 0 | Refills: 0 | DISCHARGE
Start: 2020-12-28

## 2020-12-28 RX ADMIN — Medication 20 MILLIGRAM(S): at 10:15

## 2020-12-28 RX ADMIN — AMLODIPINE BESYLATE 2.5 MILLIGRAM(S): 2.5 TABLET ORAL at 10:15

## 2020-12-28 NOTE — CONSULT NOTE ADULT - ASSESSMENT
89 year old known to [facility] (Kathy Coyne and Cliff) for stage 3a lung adenocarcinoma, complated RT alone on 9/21/2020 admitted with rectal bleed.    #Rectal bleed  -GI eval appreciated  -unclear whether due to hemorrhoids vs. underlying polp  -pt refusing in patient colonoscopy  -of note outside CT ab/p on 12/22/2020 showed no evidence of pathology within colon; was noted to have severe right hydroureteronephrosis to the level of the aortic bifurcation (proximal/mid  ureter), new since January, 2020, without identifiable cause on CT scan  and probably related to ureteral stricture.    -baseline Hgb from 12/3/2020 was 13.6.  Hgb is lower here but stable    #Stage 3a Lung adenoca s/p RT   -currently in remission based on CT chest in early Dec 2020 with exception of bilateral small to moderate efffusions of unclear etiology.  Was scheduled for outpatient cardiology work up.  -will need outpatient f/u with Dr. Coyne    We will be happy to answer any onc questions on behalf of [facility] and will be in touch with them if needed     Samir Casillas MD  Hematology/Oncology  Cell:  [phone]  Office Phone: [phone]  Office Fax:  [phone] [address] [place] [address]    89 year old known to [facility] (Kathy Coyne and Cliff) for stage 3a lung adenocarcinoma, complated RT alone on 9/21/2020 admitted with rectal bleed.    #Rectal bleed  -GI eval appreciated  -unclear whether due to hemorrhoids vs. underlying polp;  former is most likely  -pt refusing inpatient colonoscopy  -of note outside CT ab/p on 12/22/2020 showed no evidence of pathology within colon; was noted to have severe right hydroureteronephrosis to the level of the aortic bifurcation (proximal/mid  ureter), new since January, 2020, without identifiable cause on CT scan  and probably related to ureteral stricture.   Has outpatient urology consult scheduled by Dr. Coyne  -baseline Hgb from 12/3/2020 was 13.6.  Hgb is lower here but stable    #Stage 3a Lung adenoca s/p RT   -currently in remission based on CT chest in early Dec 2020 with exception of bilateral small to moderate efffusions of unclear etiology.  Was scheduled for outpatient cardiology work up.  -will need outpatient f/u with Dr. Coyne    Dispo:  -as long as vitals stable, no objection from our standpoint for discharge.  We will be happy to answer any onc questions on behalf of [facility] and will be in touch with them if needed     Samir Casillas MD  Hematology/Oncology  Cell:  [phone]  Office Phone: [phone]  Office Fax:  [phone] [address] [place] [address]

## 2020-12-28 NOTE — DISCHARGE NOTE PROVIDER - NSDCCPCAREPLAN_GEN_ALL_CORE_FT
PRINCIPAL DISCHARGE DIAGNOSIS  Diagnosis: Rectal bleeding  Assessment and Plan of Treatment: AC resumed. Outpatient colonoscopy with DR Hampton

## 2020-12-28 NOTE — PROGRESS NOTE ADULT - ASSESSMENT
Assessment:  	    * Hematochezia  with the differential of lower bleed including source of rectal bleeding , hemorrhoids or diverticular bleed   hb shows only mild decline . no major bleeding since admission and INR  trending down .  continue to hold coumadin untill cleared by the G.I and consult is pending     * AF  continue betablocker and monitor for the rate control    * acute kidney injury likely secondary to volume loss and cr improved   would discontinue fluids for now and encourage po intake   - positive urine analysis   patient has no urinary symptoms and would follow up the urine cultures     - other issues include lung cancer completed radiation therapy with the small left effusion and follow up at [facility]     - CAD   continue beta blcoker now and hold ASA and coumadin until seen by the G.I     - use of venodynes for the dvt prophylax 
  Assessment:  	    * Hematochezia  with the differential of lower bleed including source of rectal bleeding , hemorrhoids or diverticular bleed   hb shows only mild decline . no major bleeding since admission and INR  trending down .  would restart the coumadin as per the GI with no active bleeding and monitor for 24 hours     * AF  continue betablocker her home dose 100mg and norvasc 2.5 with the resumption of lasix     * acute kidney injury likely secondary to volume loss and cr improved   off the fluids and cr improved will restart her home lasix now     - positive urine analysis   patient has no urinary symptoms and urine cultues were -poly microbial indicative of contamination     - other issues include lung cancer completed radiation therapy with the small left effusion and follow up at [facility]     - CAD   continue beta blcoker now and as per the patient she do not ASA at home for the CAD     - use of venodynes for the dvt prophylax     montior for the bleeding for another 24 hours with the resumption of coumadin with the high MARILYN score as no colonoscopy was planned in this admission and if remained stable anticipate for the discharge in the A.M 
pt states that her colonoscopy was negative.  However, she also states colonoscopy was done at the hospital although Dr. Mackey has not been at [facility] in more than 2 years.  DW staff to get old colon records  Patient states that if she has to have another colonoscopy, she would like to have it done with the same doctor, Dr Hampton    On rectal examination, she has brown stools which are firm and also has hemorrhoids.    Likely bleeding secondary to hemorrhoids.  Has a mild decrease in hematocrit but I am unsure what her baseline is at and stable now    Option of colonoscopy reviewed and she would like to consider that as an outpatient.    Serial H&H's, clinical follow-up.    Risk of holding anticoagulation with atrial fibrillation discussed with patient and understanding risks, she would like to resume anticoagulation.          if HCT stable, then DC planning, FU with Dr Hampton
* Hematochezia  with the differential of lower bleed including source of rectal bleeding , hemorrhoids or diverticular bleed / mild anemia acute blood loss  hb shows only mild decline . no major bleeding since admission and INR  trending down .  would restart the coumadin as per the GI with no active bleeding and monitor for 24 hours  No plans for colonoscopy during current visit- pt will schedule outpatient colonoscopy with DR Hampton     * AF  continue betablocker her home dose 100mg and norvasc 2.5 with the resumption of lasix   Resumed AC by coumadin    *HIMA- resolved    - positive urine analysis   patient has no urinary symptoms and urine cultues were -poly microbial indicative of contamination     - other issues include lung cancer completed radiation therapy with the small left effusion and follow up at [facility]   Onc chandra appreciated- to f/u as outpatient with DR Coyne    - CAD   continue beta blcoker now and as per the patient she do not ASA at home for the CAD     - use of venodynes for the dvt prophylax     Dispo- home today with outpatient colonoscopy. DC time 45 mins. D/w pt

## 2020-12-28 NOTE — DISCHARGE NOTE PROVIDER - CARE PROVIDER_API CALL
Robinson Botello  CARDIOVASCULAR DISEASE  [address] [place] [address]  Phone: [phone]  Fax: [phone]  Follow Up Time: 1 week    Renato Hampton)  Gastroenterology; Internal Medicine  [address] [place] [address]  Phone: [phone]  Fax: [phone]  Follow Up Time: 2 weeks

## 2020-12-28 NOTE — DISCHARGE NOTE PROVIDER - PROVIDER TOKENS
PROVIDER:[TOKEN:[[id number]],FOLLOWUP:[1 week]],PROVIDER:[TOKEN:[[id number]],FOLLOWUP:[2 weeks]]

## 2020-12-28 NOTE — DISCHARGE NOTE PROVIDER - CARE PROVIDERS DIRECT ADDRESSES
,DirectAddress_Unknown,[email or web address]

## 2020-12-28 NOTE — DISCHARGE NOTE PROVIDER - HOSPITAL COURSE
Patient presented with hematochezia, seen by GI. Coumadin reversed on admission with vit K. No colonoscopy planned in the hospital- pt will schedule it as outpatient with DR Hampton

## 2020-12-28 NOTE — CONSULT NOTE ADULT - SUBJECTIVE AND OBJECTIVE BOX
89 y/o female known to [facility] (Kathy Coyne and Corey Coronado) for Stage 3a lung adenocarcinoma (left upper lobe) s/p RT (5500 cGy over 20 fractions), history of Afib on Coumadin, HTN, Breast CA (age 55) , CAD admitted for rectal bleeding x three days. Daughter got concerned so she called EMS Denies abd pain. Last colonoscopy was 2 years ago, pt states was normal. No nausea or vomiting; no dizziness or lightheadedness; no chest pain; no sob;     Has been seen by gastroenterology.  noted to have hemorrohoids.  No evidence of active bleed.      PAST MEDICAL HISTORY:  Afib     Breast CA  s/p mastectomy (left) age 55    HTN (hypertension).     PAST SURGICAL HISTORY:  No significant past surgical history.    Social History:  Social History (marital status, living situation, occupation, tobacco use, alcohol and drug use, and sexual history): neg    Tobacco Screening:  · Core Measure Site	Yes  · Has the patient used tobacco in the past 30 days?	No     acetaminophen   Tablet .. 650 milliGRAM(s) Oral every 6 hours PRN  aluminum hydroxide/magnesium hydroxide/simethicone Suspension 30 milliLiter(s) Oral every 4 hours PRN  amLODIPine   Tablet 2.5 milliGRAM(s) Oral daily  furosemide    Tablet 20 milliGRAM(s) Oral daily  metoprolol tartrate 100 milliGRAM(s) Oral at bedtime  ondansetron Injectable 4 milliGRAM(s) IV Push every 6 hours PRN  warfarin 6 milliGRAM(s) Oral at bedtime      Biaxin (Other)  penicillins (Other)      ROS otherwise negative     T(C): 36.2 (12-28-20 @ 09:11), Max: 36.8 (12-27-20 @ 20:08)  HR: 50 (12-28-20 @ 09:11) (50 - 107)  BP: 133/73 (12-28-20 @ 09:11) (125/64 - 142/70)  RR: 18 (12-28-20 @ 09:11) (16 - 18)  SpO2: 98% (12-28-20 @ 09:11) (98% - 98%)                            10.2   6.98  )-----------( 254      ( 28 Dec 2020 06:36 )             32.2                         10.2   8.23  )-----------( 266      ( 27 Dec 2020 06:25 )             32.1                         10.2   7.10  )-----------( 259      ( 26 Dec 2020 06:25 )             33.0   12-28    141  |  106  |  28<H>  ----------------------------<  89  4.3   |  31  |  1.11    Ca    9.9      28 Dec 2020 06:36    PT/INR - ( 27 Dec 2020 06:25 )   PT: 15.5 sec;   INR: 1.34 ratio           
Patient is a 89y old  Female who presents with a chief complaint of rectal bleed (26 Dec 2020 15:05)      HPI:  89 y/o female with a PMHx of Afib on Coumadin, HTN, Breast CA, Lung CA, CAD BIBA for rectal bleeding x three days. Pt states that she has been bleeding from her rectum for the past three days. Pt states that yesterday she was trying to have a BM, when a lot of blood "came out" per patient; States the bleeding became worse during the night. Daughter got concerned so she called EMS this morning. Denies abd pain. Last colonoscopy was 2 years ago, pt states was normal. No nausea or vomiting; no dizziness or lightheadedness; no chest pain; no sob; Oncologist: [facility]- finished RXT for lung cancer with good results. (25 Dec 2020 11:00)    Patient describes the blood as being bright red blood in the toilet.  Intermittently, she has noted small amount of bright red blood in the toilet as well.  Patient states she had a colonoscopy 2 years ago by Dr. Lynn,       pt states that her colonoscopy was negative.  However, she also states colonoscopy was done at the hospital although Dr. Mackey has not been at [facility] in more than 2 years.  Patient states that if she has to have another colonoscopy, she would like to have it done with the same doctor.    On rectal examination, she has brown stools which are firm and also has hemorrhoids.    PAST MEDICAL & SURGICAL HISTORY:  HTN (hypertension)    Breast CA    Afib    No significant past surgical history        MEDICATIONS  (STANDING):  amLODIPine   Tablet 2.5 milliGRAM(s) Oral daily  metoprolol tartrate 100 milliGRAM(s) Oral at bedtime  metoprolol tartrate 50 milliGRAM(s) Oral daily    MEDICATIONS  (PRN):  acetaminophen   Tablet .. 650 milliGRAM(s) Oral every 6 hours PRN Temp greater or equal to 38.5C (101.3F), Mild Pain (1 - 3)  aluminum hydroxide/magnesium hydroxide/simethicone Suspension 30 milliLiter(s) Oral every 4 hours PRN Dyspepsia  ondansetron Injectable 4 milliGRAM(s) IV Push every 6 hours PRN Nausea      Allergies    Biaxin (Other)  penicillins (Other)    Intolerances        SOCIAL HISTORY:neg drugs    FAMILY HISTORY:NC      REVIEW OF SYSTEMS:    CONSTITUTIONAL: pos weakness, no fevers or chills  EYES/ENT: No visual changes;  No vertigo or throat pain   NECK: No pain or stiffness  RESPIRATORY: No cough, wheezing, hemoptysis; No shortness of breath  CARDIOVASCULAR: No chest pain or palpitations  GENITOURINARY: No dysuria, frequency or hematuria  NEUROLOGICAL: No numbness or weakness  SKIN: No itching, burning, rashes, or lesions   All other review of systems is negative unless indicated above.    Vital Signs Last 24 Hrs  T(C): 36.3 (27 Dec 2020 09:23), Max: 36.9 (26 Dec 2020 20:41)  T(F): 97.3 (27 Dec 2020 09:23), Max: 98.4 (26 Dec 2020 20:41)  HR: 68 (27 Dec 2020 09:30) (58 - 114)  BP: 137/82 (27 Dec 2020 09:30) (119/58 - 143/79)  BP(mean): 67 (27 Dec 2020 09:23) (67 - 94)  RR: 16 (27 Dec 2020 09:23) (16 - 18)  SpO2: 98% (27 Dec 2020 09:23) (96% - 98%)    PHYSICAL EXAM:    Constitutional: NAD, well-developed  HEENT: EOMI, throat clear  Neck: No LAD, supple  Respiratory: CTA and P  Cardiovascular: S1 and S2, RRR, no M  Gastrointestinal: BS+, soft, NT/ND, neg HSM,  Extremities: No peripheral edema, neg clubing, cyanosis  Vascular: 2+ peripheral pulses  Neurological: A/O x 3, no focal deficits  Psychiatric: Normal mood, normal affect  Skin: No rashes    LABS:  CBC Full  -  ( 27 Dec 2020 06:25 )  WBC Count : 8.23 K/uL  RBC Count : 3.47 M/uL  Hemoglobin : 10.2 g/dL  Hematocrit : 32.1 %  Platelet Count - Automated : 266 K/uL  Mean Cell Volume : 92.5 fl  Mean Cell Hemoglobin : 29.4 pg  Mean Cell Hemoglobin Concentration : 31.8 gm/dL  Auto Neutrophil # : x  Auto Lymphocyte # : x  Auto Monocyte # : x  Auto Eosinophil # : x  Auto Basophil # : x  Auto Neutrophil % : x  Auto Lymphocyte % : x  Auto Monocyte % : x  Auto Eosinophil % : x  Auto Basophil % : x    12-27    143  |  110<H>  |  28<H>  ----------------------------<  108<H>  4.7   |  29  |  1.00    Ca    9.8      27 Dec 2020 06:25    TPro  7.0  /  Alb  3.3  /  TBili  0.5  /  DBili  x   /  AST  20  /  ALT  26  /  AlkPhos  81  12-25    PT/INR - ( 27 Dec 2020 06:25 )   PT: 15.5 sec;   INR: 1.34 ratio         PTT - ( 25 Dec 2020 09:57 )  PTT:92.5 sec        RADIOLOGY & ADDITIONAL STUDIES:  < from: Xray Chest 1 View- PORTABLE-Urgent (Xray Chest 1 View- PORTABLE-Urgent .) (12.25.20 @ 10:50) >  EXAM:  XR CHEST PORTABLE URGENT 1V                            PROCEDURE DATE:  12/25/2020          INTERPRETATION:  Clinical Information: Chest pain    Technique: AP chest image.    Comparison: 12/25/2019    Findings/  Impression: Cardiomegaly. Trace left pleural effusion            TYLER ROMEO MD; Attending Interventional Radiologist  This document has been electronically signed. Dec 25 2020  4:09PM    < end of copied text >

## 2020-12-28 NOTE — DISCHARGE NOTE PROVIDER - NSDCMRMEDTOKEN_GEN_ALL_CORE_FT
amLODIPine 2.5 mg oral tablet: 1 tab(s) orally once a day  Lasix 20 mg oral tablet: 1 tab(s) orally once a day  Metoprolol Tartrate 100 mg oral tablet: 1 tab(s) orally once a day (at bedtime)  Metoprolol Tartrate 50 mg oral tablet: orally once a day  warfarin 6 mg oral tablet: 1 tab(s) orally once a day (at bedtime) To take 1.5tab=9mg on 12/28 and 12/29 then resume her usual home dosage on 12/30

## 2020-12-28 NOTE — DISCHARGE NOTE NURSING/CASE MANAGEMENT/SOCIAL WORK - PATIENT PORTAL LINK FT
You can access the FollowMyHealth Patient Portal offered by [facility] by registering at the following website: [email or web address]. By joining [facility]’s FollowMyHealth portal, you will also be able to view your health information using other applications (apps) compatible with our system.

## 2020-12-28 NOTE — DISCHARGE NOTE PROVIDER - DISCHARGE DATE
28-Dec-2020 Progress Notes by Bethel Walsh MD at 10/29/18 10:30 AM     Author:  Bethel Walsh MD Service:  (none) Author Type:  Physician     Filed:  10/29/18 12:30 PM Encounter Date:  10/29/2018 Status:  Signed     :  Bethel Walsh MD (Physician)            Dear[GS1.1T] Dr. Yeimi Way D.O.[GS1.2M],    Ms. Gil was here today for a maternal fetal medicine consultation.  She is a 35 year old who is 20w6d.  This pregnancy is notable for advanced maternal age[GS1.1T], history of 2 previous C-sections, and elevated body mass index.[GS1.1M]    Obstetric History       T2      L2     SAB1   TAB0   Ectopic0   Multiple0   Live Births2[GS1.1T]        BP[GS1.1M]  116/70[GS1.3M]  Weight[GS1.1M] 193 pounds[GS1.3M]    The ultrasound displayed estimated fetal weight of 347 g.  The growth parameters are concordant.  Fetal heart rate is 138 bpm and the deepest vertical pocket is 5.1 cm    The ultrasound did not display any markers of fetal syndromes or birth defects    The placenta is anterior however there is no ultrasound evidence of placenta accreta[GS1.1M]    During today's visit I counseled Ms. Gil on advanced maternal age.  We discussed increased risk of aneuploidy and we discussed the several testing options.  We discussed amniocentesis.  I counseled Ms. Gil that amniocentesis is the definitive method to access for chromosomal abnormalities.  After counseling and discussion of risk and benefits, Ms. Gil[GS1.1T] did not[GS1.1M] desire this option.[GS1.1T]   The patient had a low risk cell free DNA test previously[GS1.1M]    We then discussed advanced maternal age in pregnancy and risks to gestational diabetes and hypertensive disorders of pregnancy, as well as growth restriction.  These conditions should be monitored for carefully throughout pregnancy.[GS1.1T]    We discussed elevated body mass index.  There is an increased risk of  labor and gestational diabetes.  We would recommend that  weight gain is now more than 20 pounds[GS1.1M]    RECOMMENDATIONS:[GS1.1T]      1[GS1.1M]. Follow-up growth ultrasound is recommended at approximately 30 weeks gestational age.[GS1.1T]     2[GS1.1M]. Fetal kick counts daily beginning at 28 weeks gestational age.[GS1.1T]     3[GS1.1M]. Weekly non-stress testing and TIFFANIE beginning at 36 weeks gestational age.[GS1.1T]    4.  Repeat  at 39 weeks    5.  The patient was counseled on dietary and exercise expectations at this time and pregnancy    6.  If weight gain is more than 20 pounds nutrition consult is recommended[GS1.1M]    At the time of this visit, all of the patient's questions and concerns were addressed. This was a[GS1.1T] 4[GS1.1M]0 minute visit with 50% of time dedicated to face to face counseling.  If there are any additional questions, she may contact me at any time.     Electronically Signed by:    Bethel Walsh MD , 10/29/2018[GS1.1T]       Revision History        User Key Date/Time User Provider Type Action    > GS1.2 10/29/18 12:30 PM Bethel Walsh MD Physician Sign     GS1.3 10/29/18 10:49 AM Bethel Walsh MD Physician      GS1.1 10/29/18 10:30 AM Bethel Walsh MD Physician     M - Manual, T - Template

## 2020-12-31 DIAGNOSIS — C34.90 MALIGNANT NEOPLASM OF UNSPECIFIED PART OF UNSPECIFIED BRONCHUS OR LUNG: ICD-10-CM

## 2020-12-31 DIAGNOSIS — K64.9 UNSPECIFIED HEMORRHOIDS: ICD-10-CM

## 2020-12-31 DIAGNOSIS — Z92.3 PERSONAL HISTORY OF IRRADIATION: ICD-10-CM

## 2020-12-31 DIAGNOSIS — N17.9 ACUTE KIDNEY FAILURE, UNSPECIFIED: ICD-10-CM

## 2020-12-31 DIAGNOSIS — Z98.890 OTHER SPECIFIED POSTPROCEDURAL STATES: ICD-10-CM

## 2020-12-31 DIAGNOSIS — D62 ACUTE POSTHEMORRHAGIC ANEMIA: ICD-10-CM

## 2020-12-31 DIAGNOSIS — K57.31 DIVERTICULOSIS OF LARGE INTESTINE WITHOUT PERFORATION OR ABSCESS WITH BLEEDING: ICD-10-CM

## 2020-12-31 DIAGNOSIS — I25.10 ATHEROSCLEROTIC HEART DISEASE OF NATIVE CORONARY ARTERY WITHOUT ANGINA PECTORIS: ICD-10-CM

## 2020-12-31 DIAGNOSIS — Z85.3 PERSONAL HISTORY OF MALIGNANT NEOPLASM OF BREAST: ICD-10-CM

## 2020-12-31 DIAGNOSIS — I10 ESSENTIAL (PRIMARY) HYPERTENSION: ICD-10-CM

## 2020-12-31 DIAGNOSIS — Z66 DO NOT RESUSCITATE: ICD-10-CM

## 2020-12-31 DIAGNOSIS — I48.91 UNSPECIFIED ATRIAL FIBRILLATION: ICD-10-CM

## 2020-12-31 DIAGNOSIS — Z79.01 LONG TERM (CURRENT) USE OF ANTICOAGULANTS: ICD-10-CM

## 2020-12-31 DIAGNOSIS — K63.5 POLYP OF COLON: ICD-10-CM

## 2021-03-28 ENCOUNTER — INPATIENT (INPATIENT)
Facility: HOSPITAL | Age: 86
LOS: 5 days | Discharge: HOME HEALTH | DRG: 291 | End: 2021-04-03
Attending: INTERNAL MEDICINE | Admitting: HOSPITALIST
Payer: MEDICARE

## 2021-03-28 VITALS
HEIGHT: 63 IN | WEIGHT: 119.93 LBS | DIASTOLIC BLOOD PRESSURE: 127 MMHG | OXYGEN SATURATION: 100 % | TEMPERATURE: 97 F | SYSTOLIC BLOOD PRESSURE: 182 MMHG | RESPIRATION RATE: 26 BRPM | HEART RATE: 116 BPM

## 2021-03-28 DIAGNOSIS — I50.9 HEART FAILURE, UNSPECIFIED: ICD-10-CM

## 2021-03-28 LAB
ALBUMIN SERPL ELPH-MCNC: 3.3 G/DL — SIGNIFICANT CHANGE UP (ref 3.3–5)
ALP SERPL-CCNC: 110 U/L — SIGNIFICANT CHANGE UP (ref 40–120)
ALT FLD-CCNC: 36 U/L — SIGNIFICANT CHANGE UP (ref 12–78)
ANION GAP SERPL CALC-SCNC: 5 MMOL/L — SIGNIFICANT CHANGE UP (ref 5–17)
AST SERPL-CCNC: 36 U/L — SIGNIFICANT CHANGE UP (ref 15–37)
BASE EXCESS BLDV CALC-SCNC: 0.3 MMOL/L — SIGNIFICANT CHANGE UP (ref -2–2)
BASOPHILS # BLD AUTO: 0.07 K/UL — SIGNIFICANT CHANGE UP (ref 0–0.2)
BASOPHILS NFR BLD AUTO: 0.5 % — SIGNIFICANT CHANGE UP (ref 0–2)
BILIRUB SERPL-MCNC: 0.4 MG/DL — SIGNIFICANT CHANGE UP (ref 0.2–1.2)
BUN SERPL-MCNC: 30 MG/DL — HIGH (ref 7–23)
CALCIUM SERPL-MCNC: 10.3 MG/DL — HIGH (ref 8.5–10.1)
CHLORIDE SERPL-SCNC: 103 MMOL/L — SIGNIFICANT CHANGE UP (ref 96–108)
CO2 SERPL-SCNC: 29 MMOL/L — SIGNIFICANT CHANGE UP (ref 22–31)
CREAT SERPL-MCNC: 1.49 MG/DL — HIGH (ref 0.5–1.3)
EOSINOPHIL # BLD AUTO: 0.09 K/UL — SIGNIFICANT CHANGE UP (ref 0–0.5)
EOSINOPHIL NFR BLD AUTO: 0.7 % — SIGNIFICANT CHANGE UP (ref 0–6)
GLUCOSE SERPL-MCNC: 158 MG/DL — HIGH (ref 70–99)
HCO3 BLDV-SCNC: 30 MMOL/L — HIGH (ref 21–29)
HCT VFR BLD CALC: 44.7 % — SIGNIFICANT CHANGE UP (ref 34.5–45)
HGB BLD-MCNC: 14.2 G/DL — SIGNIFICANT CHANGE UP (ref 11.5–15.5)
IMM GRANULOCYTES NFR BLD AUTO: 0.5 % — SIGNIFICANT CHANGE UP (ref 0–1.5)
LYMPHOCYTES # BLD AUTO: 1.3 K/UL — SIGNIFICANT CHANGE UP (ref 1–3.3)
LYMPHOCYTES # BLD AUTO: 10 % — LOW (ref 13–44)
MAGNESIUM SERPL-MCNC: 2.6 MG/DL — SIGNIFICANT CHANGE UP (ref 1.6–2.6)
MCHC RBC-ENTMCNC: 28.9 PG — SIGNIFICANT CHANGE UP (ref 27–34)
MCHC RBC-ENTMCNC: 31.8 GM/DL — LOW (ref 32–36)
MCV RBC AUTO: 90.9 FL — SIGNIFICANT CHANGE UP (ref 80–100)
MONOCYTES # BLD AUTO: 1.08 K/UL — HIGH (ref 0–0.9)
MONOCYTES NFR BLD AUTO: 8.3 % — SIGNIFICANT CHANGE UP (ref 2–14)
NEUTROPHILS # BLD AUTO: 10.44 K/UL — HIGH (ref 1.8–7.4)
NEUTROPHILS NFR BLD AUTO: 80 % — HIGH (ref 43–77)
NT-PROBNP SERPL-SCNC: HIGH PG/ML (ref 0–450)
PCO2 BLDV: 72 MMHG — HIGH (ref 39–42)
PH BLDV: 7.24 — LOW (ref 7.35–7.45)
PLATELET # BLD AUTO: 423 K/UL — HIGH (ref 150–400)
PO2 BLDV: 64 MMHG — HIGH (ref 25–45)
POTASSIUM SERPL-MCNC: 4.8 MMOL/L — SIGNIFICANT CHANGE UP (ref 3.5–5.3)
POTASSIUM SERPL-SCNC: 4.8 MMOL/L — SIGNIFICANT CHANGE UP (ref 3.5–5.3)
PROT SERPL-MCNC: 7.8 GM/DL — SIGNIFICANT CHANGE UP (ref 6–8.3)
RBC # BLD: 4.92 M/UL — SIGNIFICANT CHANGE UP (ref 3.8–5.2)
RBC # FLD: 13.8 % — SIGNIFICANT CHANGE UP (ref 10.3–14.5)
SAO2 % BLDV: 86 % — SIGNIFICANT CHANGE UP (ref 67–88)
SODIUM SERPL-SCNC: 137 MMOL/L — SIGNIFICANT CHANGE UP (ref 135–145)
TROPONIN I SERPL-MCNC: 0.03 NG/ML — SIGNIFICANT CHANGE UP (ref 0.01–0.04)
TROPONIN I SERPL-MCNC: 0.04 NG/ML — SIGNIFICANT CHANGE UP (ref 0.01–0.04)
WBC # BLD: 13.05 K/UL — HIGH (ref 3.8–10.5)
WBC # FLD AUTO: 13.05 K/UL — HIGH (ref 3.8–10.5)

## 2021-03-28 PROCEDURE — 85027 COMPLETE CBC AUTOMATED: CPT

## 2021-03-28 PROCEDURE — 71045 X-RAY EXAM CHEST 1 VIEW: CPT

## 2021-03-28 PROCEDURE — 84484 ASSAY OF TROPONIN QUANT: CPT

## 2021-03-28 PROCEDURE — 97116 GAIT TRAINING THERAPY: CPT | Mod: GP

## 2021-03-28 PROCEDURE — 93306 TTE W/DOPPLER COMPLETE: CPT

## 2021-03-28 PROCEDURE — 36415 COLL VENOUS BLD VENIPUNCTURE: CPT

## 2021-03-28 PROCEDURE — 83880 ASSAY OF NATRIURETIC PEPTIDE: CPT

## 2021-03-28 PROCEDURE — 81001 URINALYSIS AUTO W/SCOPE: CPT

## 2021-03-28 PROCEDURE — 84443 ASSAY THYROID STIM HORMONE: CPT

## 2021-03-28 PROCEDURE — 97162 PT EVAL MOD COMPLEX 30 MIN: CPT | Mod: GP

## 2021-03-28 PROCEDURE — 94660 CPAP INITIATION&MGMT: CPT

## 2021-03-28 PROCEDURE — 93005 ELECTROCARDIOGRAM TRACING: CPT

## 2021-03-28 PROCEDURE — 86769 SARS-COV-2 COVID-19 ANTIBODY: CPT

## 2021-03-28 PROCEDURE — 97530 THERAPEUTIC ACTIVITIES: CPT | Mod: GP

## 2021-03-28 PROCEDURE — 93010 ELECTROCARDIOGRAM REPORT: CPT

## 2021-03-28 PROCEDURE — 71045 X-RAY EXAM CHEST 1 VIEW: CPT | Mod: 26

## 2021-03-28 PROCEDURE — 99285 EMERGENCY DEPT VISIT HI MDM: CPT | Mod: CS

## 2021-03-28 PROCEDURE — 99497 ADVNCD CARE PLAN 30 MIN: CPT | Mod: 25

## 2021-03-28 PROCEDURE — 80048 BASIC METABOLIC PNL TOTAL CA: CPT

## 2021-03-28 PROCEDURE — 85610 PROTHROMBIN TIME: CPT

## 2021-03-28 PROCEDURE — 99223 1ST HOSP IP/OBS HIGH 75: CPT

## 2021-03-28 RX ORDER — FUROSEMIDE 40 MG
40 TABLET ORAL ONCE
Refills: 0 | Status: COMPLETED | OUTPATIENT
Start: 2021-03-28 | End: 2021-03-28

## 2021-03-28 RX ORDER — ACETAMINOPHEN 500 MG
650 TABLET ORAL EVERY 6 HOURS
Refills: 0 | Status: DISCONTINUED | OUTPATIENT
Start: 2021-03-28 | End: 2021-04-03

## 2021-03-28 RX ORDER — FUROSEMIDE 40 MG
40 TABLET ORAL EVERY 12 HOURS
Refills: 0 | Status: ACTIVE | OUTPATIENT
Start: 2021-03-28 | End: 2022-02-24

## 2021-03-28 RX ADMIN — Medication 40 MILLIGRAM(S): at 20:39

## 2021-03-28 NOTE — ED PROVIDER NOTE - CARDIAC, MLM
+tachycardic, regular rhythm.  Heart sounds S1, S2.  No murmurs, rubs or gallops. +tachycardic, regular rhythm.  Heart sounds S1, S2.  No rubs or gallops. 2+ pulses in bilateral dp and radial arteries.

## 2021-03-28 NOTE — ED PROVIDER NOTE - CLINICAL SUMMARY MEDICAL DECISION MAKING FREE TEXT BOX
Plan for BiPAP, basic blood work, including VBG, CXR. Pt in obvious pulmonary distress, will likely require admission. Plan for BiPAP, basic blood work, including VBG, CXR. Pt in obvious pulmonary distress, will likely require admission.    Evelyn POZO: Updated family with the results of the labs and imaging.

## 2021-03-28 NOTE — ED PROVIDER NOTE - CONSTITUTIONAL, MLM
Well appearing, awake, alert, oriented to person, place, time/situation and in no mild distress due to SOB. Non-toxic. normal...

## 2021-03-28 NOTE — H&P ADULT - ASSESSMENT
89 year old female w hx AFIB on AC,  remote Breast CA s/p L mastectomy, CONNOR Lung CA, HTN, primary PHP presented to ED by EMS c/o increased SOB      #Acute respiratory failure: hypercarbic  #Acute decomp CHF w BNP 12K  CXR + pleural effusions and PVC; exam +JVD, +rales  1. admit to CICU  2. serial trop  3. continue BiPAP 12/5 on 2 L  4. daily weight  5. I/O  6. s/p lasix 40 mg IV  7. lasix 40 mg 1v q 12  8. ECHO  9. cardiology  10 pt on lasix ? dose at home      #PAF  1. continue coumadin tonight as 8 mg   2. PT/INR in AM  3. rate control w metoprolol 100 mg q hs w parameters  4. metoprolol 50 mg q AM w parameters      #CONNOR stage 3a adenocarcinoma lung  pt states in [id number] on last scan      #HTN  1.metoprolol as above  2. amlodipine 2.5      #Remote breast CA L mastectomy  1. no BP on that side      #Goals of care  pt wants DNR/DNI  MOLST form completed  discussed for 13 minutes      #VTE   IMPROVE VTE Individual Risk Assessment    RISK                                                                Points    [  ] Previous VTE                                                  3    [ x ] Thrombophilia                                               2    [  ] Lower limb paralysis                                      2        (unable to hold up >15 seconds)      [  ] Current Cancer                                              2         (within 6 months)    [  ] Immobilization > 24 hrs                                1    [  ] ICU/CCU stay > 24 hours                              1    [ X ] Age > 60                                                      1    IMPROVE VTE Score ___3______    IMPROVE Score 0-1: Low Risk, No VTE prophylaxis required for most patients, encourage ambulation.   IMPROVE Score 2-3: At risk, pharmacologic VTE prophylaxis is indicated for most patients (in the absence of a contraindication)  IMPROVE Score > or = 4: High Risk, pharmacologic VTE prophylaxis is indicated for most patients (in the absence of a contraindication)        VTE coumadin 8 mg tonight  med rec  echo  cardiology  case managment/ re discharge planning   89 year old female w hx AFIB on AC,  remote Breast CA s/p L mastectomy, CONNOR Lung CA, HTN, primary PHP presented to ED by EMS c/o increased SOB      #Acute respiratory failure: hypercapnea  #Acute decomp CHF w BNP 12K  CXR + pleural effusions and PVC; exam +JVD, +rales  1. admit to CICU  2. serial trop  3. continue BiPAP 12/5 on 2 L  4. daily weight  5. I/O  6. s/p lasix 40 mg IV  7. lasix 40 mg 1v q 12  8. ECHO  9. cardiology  10 pt on lasix ? dose at home      #PAF  1. continue coumadin tonight as 8 mg   2. PT/INR in AM  3. rate control w metoprolol 100 mg q hs w parameters  4. metoprolol 50 mg q AM w parameters      #CONNOR stage 3a adenocarcinoma lung  pt states in [id number] on last scan    #Hypercalcemia  #primary HPT  1. trend s/p lasix      #HTN  1.metoprolol as above  2. amlodipine 2.5      #Remote breast CA L mastectomy  1. no BP on that side      #Goals of care  pt wants DNR/DNI  MOLST form completed  discussed for 13 minutes      #VTE   IMPROVE VTE Individual Risk Assessment    RISK                                                                Points    [  ] Previous VTE                                                  3    [ x ] Thrombophilia                                               2    [  ] Lower limb paralysis                                      2        (unable to hold up >15 seconds)      [  ] Current Cancer                                              2         (within 6 months)    [  ] Immobilization > 24 hrs                                1    [  ] ICU/CCU stay > 24 hours                              1    [ X ] Age > 60                                                      1    IMPROVE VTE Score ___3______    IMPROVE Score 0-1: Low Risk, No VTE prophylaxis required for most patients, encourage ambulation.   IMPROVE Score 2-3: At risk, pharmacologic VTE prophylaxis is indicated for most patients (in the absence of a contraindication)  IMPROVE Score > or = 4: High Risk, pharmacologic VTE prophylaxis is indicated for most patients (in the absence of a contraindication)        VTE coumadin 8 mg tonight  med rec  echo  cardiology  case managment/ re discharge planning

## 2021-03-28 NOTE — ED PROVIDER NOTE - RESPIRATORY, MLM
+tachypneic, +mild retractions, pressed breathing +tachypneic, +mild retractions, pressed breathing. No retractions

## 2021-03-28 NOTE — ED PROVIDER NOTE - NEUROLOGICAL, MLM
Alert and oriented, no focal deficits, no motor or sensory deficits. Alert and oriented, no focal deficits, no motor or sensory deficits. CNs 2-12 grossly intact. GCS=15.

## 2021-03-28 NOTE — ED PROVIDER NOTE - OBJECTIVE STATEMENT
89 y/o female with PMHx of HTN, breast CA (in remission), Afib on Coumadin presents to the ED BIBEMS from home c/o SOB, x weeks. Denies any cardiac or pulmonary history past what is listed. Pt lives alone. Denies any other complaints at this time. 89 y/o female with PMHx of HTN, breast CA (in remission), Afib on Coumadin presents to the ED BIBEMS from home c/o SOB, x weeks. Denies any cardiac or pulmonary history . Pt lives alone. Denies any other complaints at this time. No fever or chills. No melena or hematochezia.

## 2021-03-28 NOTE — ED PROVIDER NOTE - NS_ ATTENDINGSCRIBEDETAILS _ED_A_ED_FT
I Vaibhav Rivas MD saw and examined the patient. Scribe documented for me and under my supervision. I have modified the scribe's documentation where necessary to reflect my history, physical exam and other relevant documentations pertinent to the care of the patient.

## 2021-03-28 NOTE — ED PROVIDER NOTE - CARE PLAN
Principal Discharge DX:	Acute on chronic congestive heart failure, unspecified heart failure type  Assessment and plan of treatment:	Spoke with pt/family, not sure on the dose of the Lasix. Aware that lasix dosing is twice for admission however since we do not know the dosing at this time, will give 40 mg (confirmed with Dr. Nunes, hospitalist admission appreciated)  -Pleural effusion, pulmonary congestion. No fever. Hx of lung CA, Breast CA. low suspicion for PNA. No abx given. Likely pulmonary related (CHF exacerbation) vs. rule out ACS.  Secondary Diagnosis:	Respiratory distress  Secondary Diagnosis:	SOB (shortness of breath)

## 2021-03-28 NOTE — ED ADULT NURSE REASSESSMENT NOTE - NS ED NURSE REASSESS COMMENT FT1
pt it A&O x4, resting in bed. provided socks and blanket. reports improvement of work of breathing on BIPAP, tolerating well. purwick in place. MD Cortes at bedside. MOLST form signed. pt VSS. side rails up, call bell within reach. awaiting inpatient bed.

## 2021-03-28 NOTE — H&P ADULT - NSHPPHYSICALEXAM_GEN_ALL_CORE
Vital Signs Last 24 Hrs  T(C): 36.4 (28 Mar 2021 21:00), Max: 36.6 (28 Mar 2021 20:01)  T(F): 97.6 (28 Mar 2021 21:00), Max: 97.8 (28 Mar 2021 20:01)  HR: 103 (28 Mar 2021 22:00) (103 - 116)  BP: 133/85 (28 Mar 2021 22:00) (130/79 - 182/127)  BP(mean): 90 (28 Mar 2021 22:00) (90 - 90)  RR: 20 (28 Mar 2021 22:00) (17 - 26)  SpO2: 100% (28 Mar 2021 22:00) (99% - 100%)

## 2021-03-28 NOTE — ED PROVIDER NOTE - PLAN OF CARE
Spoke with pt/family, not sure on the dose of the Lasix. Aware that lasix dosing is twice for admission however since we do not know the dosing at this time, will give 40 mg (confirmed with Dr. Nunes, hospitalist admission appreciated)  -Pleural effusion, pulmonary congestion. No fever. Hx of lung CA, Breast CA. low suspicion for PNA. No abx given. Likely pulmonary related (CHF exacerbation) vs. rule out ACS.

## 2021-03-28 NOTE — H&P ADULT - HISTORY OF PRESENT ILLNESS
89 year old female w hx AFIB on AC, Breast CA s/p L mastectomy, CONNOR Lung CA, HTN, primary PHP presented to ED by EMS c/o increased SOB    Pt has had + SOB x past month  Increased dramatically today  denied hx CHF  no fever or chills      /127      RR 26    T 97.3   100% sat  Lasix 40 mg IV  CXR + PVC w effusions  EKG ST no dynamic ST-T changes  BIPAP 12/5 on 2 L     pt feels better    PAST MEDICAL HX  AFIB atrial fib  Breast CA s/p L mastectomy and RT age 55  HTN hypertension   Mitral valve insufficiency)  Neoplasm of breast   Paroxysmal atrial fibrillation   PVC's (premature ventricular contractions)  PHP primary hyperparathyroid        SURGICAL HX  Hysterectomy  Mastectomy: L  age 55    Family HX  Family history of diabetes mellitus : Son  Family history of psoriasis : Child  Family history of ischemic heart disease      SOCIAL HX  lives alone   independent  nonsmoker     89 year old female w hx AFIB on AC,  remote Breast CA s/p L mastectomy, CONNOR Lung CA, HTN, primary PHP presented to ED by EMS c/o increased SOB    Pt has had + SOB x past month  Increased dramatically today  denied hx CHF  no fever or chills  Ambulance call report not available for review      /127      RR 26    T 97.3   100% sat  Lasix 40 mg IV  CXR + PVC w effusions  EKG ST no dynamic ST-T changes  BIPAP 12/5 on 2 L     pt feels better    PAST MEDICAL HX  AFIB atrial fib  Breast CA s/p L mastectomy and RT age 55  HTN hypertension   Mitral valve insufficiency)  Neoplasm of breast   Paroxysmal atrial fibrillation   PVC's (premature ventricular contractions)  PHP primary hyperparathyroid        SURGICAL HX  Hysterectomy  Mastectomy: L  age 55    Family HX  Family history of diabetes mellitus : Son  Family history of psoriasis : Child  Family history of ischemic heart disease      SOCIAL HX  lives alone   independent  nonsmoker

## 2021-03-29 LAB
ANION GAP SERPL CALC-SCNC: 3 MMOL/L — LOW (ref 5–17)
APPEARANCE UR: CLEAR — SIGNIFICANT CHANGE UP
BILIRUB UR-MCNC: NEGATIVE — SIGNIFICANT CHANGE UP
BUN SERPL-MCNC: 31 MG/DL — HIGH (ref 7–23)
CALCIUM SERPL-MCNC: 10 MG/DL — SIGNIFICANT CHANGE UP (ref 8.5–10.1)
CHLORIDE SERPL-SCNC: 103 MMOL/L — SIGNIFICANT CHANGE UP (ref 96–108)
CO2 SERPL-SCNC: 31 MMOL/L — SIGNIFICANT CHANGE UP (ref 22–31)
COLOR SPEC: YELLOW — SIGNIFICANT CHANGE UP
COVID-19 SPIKE DOMAIN AB INTERP: POSITIVE
COVID-19 SPIKE DOMAIN ANTIBODY RESULT: 8.01 U/ML — HIGH
CREAT SERPL-MCNC: 1.35 MG/DL — HIGH (ref 0.5–1.3)
DIFF PNL FLD: NEGATIVE — SIGNIFICANT CHANGE UP
GLUCOSE SERPL-MCNC: 107 MG/DL — HIGH (ref 70–99)
GLUCOSE UR QL: NEGATIVE MG/DL — SIGNIFICANT CHANGE UP
INR BLD: 3.45 RATIO — HIGH (ref 0.88–1.16)
KETONES UR-MCNC: NEGATIVE — SIGNIFICANT CHANGE UP
LEUKOCYTE ESTERASE UR-ACNC: NEGATIVE — SIGNIFICANT CHANGE UP
NITRITE UR-MCNC: NEGATIVE — SIGNIFICANT CHANGE UP
PH UR: 6.5 — SIGNIFICANT CHANGE UP (ref 5–8)
POTASSIUM SERPL-MCNC: 4.3 MMOL/L — SIGNIFICANT CHANGE UP (ref 3.5–5.3)
POTASSIUM SERPL-SCNC: 4.3 MMOL/L — SIGNIFICANT CHANGE UP (ref 3.5–5.3)
PROT UR-MCNC: 30 MG/DL
PROTHROM AB SERPL-ACNC: 38.1 SEC — HIGH (ref 10.6–13.6)
SARS-COV-2 IGG+IGM SERPL QL IA: 8.01 U/ML — HIGH
SARS-COV-2 IGG+IGM SERPL QL IA: POSITIVE
SARS-COV-2 RNA SPEC QL NAA+PROBE: SIGNIFICANT CHANGE UP
SODIUM SERPL-SCNC: 137 MMOL/L — SIGNIFICANT CHANGE UP (ref 135–145)
SP GR SPEC: 1.01 — SIGNIFICANT CHANGE UP (ref 1.01–1.02)
TROPONIN I SERPL-MCNC: 0.17 NG/ML — HIGH (ref 0.01–0.04)
UROBILINOGEN FLD QL: NEGATIVE MG/DL — SIGNIFICANT CHANGE UP

## 2021-03-29 PROCEDURE — 99233 SBSQ HOSP IP/OBS HIGH 50: CPT

## 2021-03-29 PROCEDURE — 93010 ELECTROCARDIOGRAM REPORT: CPT

## 2021-03-29 RX ORDER — WARFARIN SODIUM 2.5 MG/1
8 TABLET ORAL ONCE
Refills: 0 | Status: COMPLETED | OUTPATIENT
Start: 2021-03-29 | End: 2021-03-29

## 2021-03-29 RX ORDER — METOPROLOL TARTRATE 50 MG
50 TABLET ORAL DAILY
Refills: 0 | Status: ACTIVE | OUTPATIENT
Start: 2021-03-29 | End: 2022-02-25

## 2021-03-29 RX ORDER — FUROSEMIDE 40 MG
1 TABLET ORAL
Qty: 0 | Refills: 0 | DISCHARGE

## 2021-03-29 RX ORDER — METOPROLOL TARTRATE 50 MG
100 TABLET ORAL AT BEDTIME
Refills: 0 | Status: DISCONTINUED | OUTPATIENT
Start: 2021-03-29 | End: 2021-04-03

## 2021-03-29 RX ORDER — AMLODIPINE BESYLATE 2.5 MG/1
2.5 TABLET ORAL DAILY
Refills: 0 | Status: DISCONTINUED | OUTPATIENT
Start: 2021-03-29 | End: 2021-04-02

## 2021-03-29 RX ORDER — APIXABAN 2.5 MG/1
2.5 TABLET, FILM COATED ORAL EVERY 12 HOURS
Refills: 0 | Status: DISCONTINUED | OUTPATIENT
Start: 2021-03-29 | End: 2021-04-03

## 2021-03-29 RX ADMIN — APIXABAN 2.5 MILLIGRAM(S): 2.5 TABLET, FILM COATED ORAL at 20:25

## 2021-03-29 RX ADMIN — WARFARIN SODIUM 8 MILLIGRAM(S): 2.5 TABLET ORAL at 02:20

## 2021-03-29 RX ADMIN — Medication 50 MILLIGRAM(S): at 09:48

## 2021-03-29 RX ADMIN — Medication 100 MILLIGRAM(S): at 01:01

## 2021-03-29 RX ADMIN — Medication 40 MILLIGRAM(S): at 06:56

## 2021-03-29 RX ADMIN — Medication 40 MILLIGRAM(S): at 18:00

## 2021-03-29 RX ADMIN — AMLODIPINE BESYLATE 2.5 MILLIGRAM(S): 2.5 TABLET ORAL at 09:48

## 2021-03-29 RX ADMIN — Medication 100 MILLIGRAM(S): at 20:25

## 2021-03-29 NOTE — PHYSICAL THERAPY INITIAL EVALUATION ADULT - PERTINENT HX OF CURRENT PROBLEM, REHAB EVAL
89 year old female w hx AFIB on AC,  remote Breast CA s/p L mastectomy, CONNOR Lung CA, HTN, primary PHP presented to ED by EMS c/o increased SOB

## 2021-03-29 NOTE — PHYSICAL THERAPY INITIAL EVALUATION ADULT - GENERAL OBSERVATIONS, REHAB EVAL
Pt rec'd sitting up in bedside chair, pleasant and cooperative with PT, no acute complaints, endorses improved breathing.

## 2021-03-29 NOTE — CONSULT NOTE ADULT - SUBJECTIVE AND OBJECTIVE BOX
Patient is a 89y old  Female who presents with a chief complaint of acute resp failure, hypercarbic  acute CHF (28 Mar 2021 23:46)      HPI:  89 year old female w hx AFIB on AC,  remote Breast CA s/p L mastectomy, CONNOR Lung CA, HTN, primary PHP presented to ED by EMS c/o increased SOB    Pt has had + SOB x past month  Increased dramatically today  denied hx CHF  no fever or chills  Ambulance call report not available for review  3/29 Cardiology: 89 year old female with history of afib, permanent has been dyspneic for several months. was given albuterol and lasix. Over last several weeks has been feeling more dyspneic. Became severely dyspneic last several days. No definite pnd or orthopnea. no le edema. no chest pain or palps.    /127      RR 26    T 97.3   100% sat  Lasix 40 mg IV  CXR + PVC w effusions  EKG ST no dynamic ST-T changes  BIPAP 12/5 on 2 L     pt feels better    PAST MEDICAL HX  AFIB atrial fib  Breast CA s/p L mastectomy and RT age 55  HTN hypertension   Mitral valve insufficiency)  Neoplasm of breast   Paroxysmal atrial fibrillation   PVC's (premature ventricular contractions)  PHP primary hyperparathyroid        SURGICAL HX  Hysterectomy  Mastectomy: L  age 55    Family HX  Family history of diabetes mellitus : Son  Family history of psoriasis : Child  Family history of ischemic heart disease      SOCIAL HX  lives alone   independent  nonsmoker     (28 Mar 2021 23:46)      PAST MEDICAL & SURGICAL HISTORY:  HTN (hypertension)    Breast CA    Afib    No significant past surgical history        PREVIOUS DIAGNOSTIC TESTING:      ECHO  FINDINGS:    STRESS  FINDINGS:    CATHETERIZATION  FINDINGS:    MEDICATIONS  (STANDING):  amLODIPine   Tablet 2.5 milliGRAM(s) Oral daily  furosemide   Injectable 40 milliGRAM(s) IV Push every 12 hours  metoprolol tartrate 100 milliGRAM(s) Oral at bedtime  metoprolol tartrate 50 milliGRAM(s) Oral daily    MEDICATIONS  (PRN):  acetaminophen   Tablet .. 650 milliGRAM(s) Oral every 6 hours PRN Temp greater or equal to 38C (100.4F), Mild Pain (1 - 3)      FAMILY HISTORY:      SOCIAL HISTORY:  ***    REVIEW OF SYSTEM:  Pertinent items are noted in HPI.  Constitutional  Eyes:    Ears, nose, mouth,   throat, and face:    Neck:   Respiratory:   and wheezing  Cardiovascular:    Gastrointestinal:  Genitourinary:     Hematologic/lymphatic:   Musculoskeletal:   Neurological:   Behavioral/Psych:        Vital Signs Last 24 Hrs  T(C): 36.1 (29 Mar 2021 06:00), Max: 36.6 (28 Mar 2021 20:01)  T(F): 97 (29 Mar 2021 06:00), Max: 97.8 (28 Mar 2021 20:01)  HR: 90 (29 Mar 2021 08:00) (56 - 116)  BP: 131/74 (29 Mar 2021 08:00) (96/44 - 182/127)  BP(mean): 83 (29 Mar 2021 08:00) (57 - 116)  RR: 25 (29 Mar 2021 08:00) (14 - 34)  SpO2: 100% (29 Mar 2021 08:09) (99% - 100%)    I&O's Summary    28 Mar 2021 07:01  -  29 Mar 2021 07:00  --------------------------------------------------------  IN: 0 mL / OUT: 575 mL / NET: -575 mL      PHYSICAL EXAM  General Appearance: cooperative, no acute distress,   HEENT: PERRL, conjunctiva clear, EOM's intact, non injected pharynx, no exudate    Neck: Supple, , no adenopathy, thyroid: not enlarged, no carotid bruit or JVD  Back: Symmetric, no  tenderness,no soft tissue tenderness  Lungs: Clear to auscultation bilaterally,no adventitious breath sounds, normal   expiratory phase  Heart: Regular rate and rhythm, S1, S2 normal, no murmur,   Abdomen: Soft, non-tender, bowel sounds active , no hepatosplenomegaly  Extremities: no cyanosis or edema, no joint swelling  Skin: Skin color, texture normal, no rashes   Neurologic: Alert and oriented X3 , cranial nerves intact, sensory and motor normal,        INTERPRETATION OF TELEMETRY:    ECG:        LABS:                          14.2   13.05 )-----------( 423      ( 28 Mar 2021 18:21 )             44.7     03-    137  |  103  |  31<H>  ----------------------------<  107<H>  4.3   |  31  |  1.35<H>    Ca    10.0      29 Mar 2021 06:23  Mg     2.6         TPro  7.8  /  Alb  3.3  /  TBili  0.4  /  DBili  x   /  AST  36  /  ALT  36  /  AlkPhos  110      CARDIAC MARKERS ( 29 Mar 2021 06:23 )  0.168 ng/mL / x     / x     / x     / x      CARDIAC MARKERS ( 28 Mar 2021 18:21 )  0.036 ng/mL / x     / x     / x     / x            Pro BNP  30824  @ 18:21  D Dimer  --  @ 18:21    PT/INR - ( 29 Mar 2021 06:23 )   PT: 38.1 sec;   INR: 3.45 ratio           Urinalysis Basic - ( 29 Mar 2021 06:59 )    Color: Yellow / Appearance: Clear / S.015 / pH: x  Gluc: x / Ketone: Negative  / Bili: Negative / Urobili: Negative mg/dL   Blood: x / Protein: 30 mg/dL / Nitrite: Negative   Leuk Esterase: Negative / RBC: Negative /HPF / WBC Negative   Sq Epi: x / Non Sq Epi: Negative / Bacteria: Negative            RADIOLOGY & ADDITIONAL STUDIES:    IMPRESSION:    PLAN:

## 2021-03-29 NOTE — PHYSICAL THERAPY INITIAL EVALUATION ADULT - IMPAIRMENTS FOUND, PT EVAL
[FreeTextEntry3] : "I, Paul Ocampo, personally scribed the services dictated to me by Dr. Horacio Malhotra MD in this documentation on 01/19/2021 "\par \par "I Dr. Horacio Malhotra MD, personally performed the services described in this documentation on 01/19/2021 for the patient as scribed by Paul Ocampo in my presence. I have reviewed and verified that all the information is accurate and true."  aerobic capacity/endurance/gait, locomotion, and balance

## 2021-03-29 NOTE — PROGRESS NOTE ADULT - SUBJECTIVE AND OBJECTIVE BOX
89 year old female w hx AFIB on AC,  remote Breast CA s/p L mastectomy, CONNOR Lung CA, HTN, primary PHP presented to ED by EMS c/o increased SOB  Was  adm for CHF exacerbation, on BIPAP and made full recovery. Feels much better, not SOB but admits her voice is weaker than baseline.    Vital Signs Last 24 Hrs  T(C): 36.8 (29 Mar 2021 09:48), Max: 36.8 (29 Mar 2021 09:48)  T(F): 98.3 (29 Mar 2021 09:48), Max: 98.3 (29 Mar 2021 09:48)  HR: 105 (29 Mar 2021 12:00) (56 - 140)  BP: 131/62 (29 Mar 2021 12:00) (96/44 - 182/127)  BP(mean): 77 (29 Mar 2021 12:00) (57 - 116)  RR: 30 (29 Mar 2021 12:00) (14 - 34)  SpO2: 100% (29 Mar 2021 12:00) (93% - 100%)      PHYSICAL EXAM  General Appearance: cooperative, no acute distress,   HEENT: PERRL, conjunctiva clear, EOM's intact, non injected pharynx, no exudate  Neck: Supple, , no adenopathy, thyroid: not enlarged, no carotid bruit or JVD  Back: Symmetric, no  tenderness, no soft tissue tenderness  Lungs: very distant sounds indicating persistent fluid diffuse   Heart: Regular rate and rhythm, S1, S2 normal, no murmur,   Abdomen: Soft, non-tender, bowel sounds active , no hepatosplenomegaly  Extremities: no cyanosis or edema, no joint swelling  Skin: Skin color, texture normal, no rashes   Neurologic: Alert and oriented X3 , cranial nerves intact, sensory and motor normal,                          14.2   13.05 )-----------( 423      ( 28 Mar 2021 18:21 )             44.7   03-29    137  |  103  |  31<H>  ----------------------------<  107<H>  4.3   |  31  |  1.35<H>    Ca    10.0      29 Mar 2021 06:23  Mg     2.6     03-28    TPro  7.8  /  Alb  3.3  /  TBili  0.4  /  DBili  x   /  AST  36  /  ALT  36  /  AlkPhos  110  03-28    89 year old female w hx AFIB on AC,  remote Breast CA s/p L mastectomy, CONNOR Lung CA, HTN, primary PHP presented to ED by EMS c/o increased SOB      #Acute respiratory failure: hypercapnea  #Acute decomp CHF w BNP 12K  CXR + pleural effusions and PVC; exam +JVD, +rales  distant breath sounds, weak voice, still fluid overloaded  teaching re: chf performed at bedside; pt tired to keep herself well hydrated at home, I explained why that cause a problem  fluid restrict 1200ml/day        #PAF  dc VKA and startrt Eliquis, pt and cardio agree  3. rate control w metoprolol 100 mg q hs w parameters  4. metoprolol 50 mg q AM w parameters      #CONNOR stage 3a adenocarcinoma lung  pt states in [id number] on last scan    #Hypercalcemia  #primary HPT  1. trend s/p lasix      #HTN  1.metoprolol as above  2. amlodipine 2.5      #Remote breast CA L mastectomy  1. no BP on that side      #Goals of care  pt wants DNR/DNI

## 2021-03-29 NOTE — PATIENT PROFILE ADULT - OVER THE PAST TWO WEEKS, HAVE YOU FELT LITTLE INTEREST OR PLEASURE IN DOING THINGS?
5/14/2019         RE: Alexi Becker  [address]   [place] [address]        Dear Colleague,    Thank you for referring your patient, Alexi Becker, to the [facility] [place]. Please see a copy of my visit note below.    S:  Complains of bilateral foot pain.  Points to medial arch.  Has had this for years.  Describes it as a burning pain.  Aggrevated by activity and relieved by rest.  Has had orthotics in the past and would like a new pain.  Also thick nails bilateral and wondering what this is from.  Denies numbness, erythema, edema, drainage.  Patient mentally handicapped and just moved in with his sister.       ROS:  A 10-point review of systems was performed and is positive for that noted in the HPI and as seen above.  All other areas are negative.        No Known Allergies    Current Outpatient Medications   Medication Sig Dispense Refill     docusate sodium (COLACE) 100 MG capsule   3     hydrOXYzine (ATARAX) 25 MG tablet Take 25 mg by mouth 3 times daily as needed for itching       lamoTRIgine (LAMICTAL) 150 MG tablet Take 150 mg by mouth 2 times daily       LevETIRAcetam (KEPPRA PO) Take 1,000 mg by mouth daily       loratadine (CLARITIN) 10 MG tablet Take 10 mg by mouth daily       magnesium hydroxide (MILK OF MAGNESIA) 400 MG/5ML suspension Take 30 mLs by mouth       omeprazole-sodium bicarbonate  MG CAPS per capsule Take 20 mg by mouth daily       order for DME Helmet, as needed with activity.       OYSTER SHELL CALCIUM PLUS D 500-125 MG-UNIT TABS   11     triamcinolone (KENALOG) 0.1 % cream Apply topically 2 times daily       carbamide peroxide (DEBROX) 6.5 % otic solution 5 drops 2 times daily       MELATONIN PO Take 1 mg by mouth At Bedtime         Patient Active Problem List   Diagnosis     Bilateral impacted cerumen     Convulsion disorder (H)     Astigmatism     Benign prostatic hyperplasia     Deafness     Gastroesophageal reflux     Hepatitis B      Mental retardation     Neurodermatitis     Osteoporosis     PPD positive     Presbyopia     Rhinitis       No past medical history on file.    No past surgical history on file.    No family history on file.    Social History     Tobacco Use     Smoking status: Never Smoker     Smokeless tobacco: Never Used   Substance Use Topics     Alcohol use: No         Exam:    Vitals: /72   Pulse 66   Wt 90.7 kg (200 lb)   BMI: There is no height or weight on file to calculate BMI.  Height: Data Unavailable    Constitutional/ general:  Pt is in no apparent distress, appears well-nourished.  Cooperative with history and physical exam.   Seen with sister.    Psych:  The patient answered questions appropriately.  Normal affect.  Seems to have reasonable expectations, in terms of treatment.     Eyes:  Visual scanning/ tracking without deficit.     Ears:  Response to auditory stimuli is normal.  Auricles in proper alignment.     Lymphatic:  Popliteal lymph nodes not enlarged.     Lungs:  Non labored breathing, non labored speech. No cough.  No audible wheezing. Even, quiet breathing.       Vascular:  positive pedal pulses bilaterally for both the DP and PT arteries.  CFT < 3 sec.  positive ankle edema.  positive pedal hair growth.    Neuro:  Alert and oriented x 3. Coordinated gait.  Light touch sensation is intact to the L4, L5, S1 distributions. No obvious deficits.  No evidence of neurological-based weakness, spasticity, or contracture in the lower extremities.      Derm: Normal texture and turgor.  No erythema, ecchymosis, or cyanosis.  Bilateral 1/2 nails mycotic and halluces quite thick      Musculoskeletal:    Lower extremity muscle strength is normal.  Patient is ambulatory without an assistive device or brace.  No gross deformities.  Normal ROM all fore foot and rearfoot joints.  No equinus.  With weightbearing patient has bilateral pronation.  No pain with palpation or ROM.  No pain with stressing any muscle  compartments.  Good calcaneal iversion with foot flexion.  no erythema edema or ecchymosis or masses noted.    A:  Pronation causing pain       Onychomycosis     P:   RX for custom orthotics.  Discussed importance of wearing these in a good shoe at all times to prevent future problems.  Discussed good house shoes at all times until resolved.  Avoid activities that bother this.  Discussed cause of thisck nails.  Discussed with patient that medicare will not pay for this service and that I do not do routine foot care in my practice unless patient at significant risk of limb loss.  Will refer to foot care service/nurse.       RETURN TO CLINIC PRN.    Josr Escobar DPM, FACFAS           Again, thank you for allowing me to participate in the care of your patient.        Sincerely,        Josr Escobar DPM     no

## 2021-03-30 LAB
ADD ON TEST-SPECIMEN IN LAB: SIGNIFICANT CHANGE UP
NT-PROBNP SERPL-SCNC: HIGH PG/ML (ref 0–450)

## 2021-03-30 PROCEDURE — 99232 SBSQ HOSP IP/OBS MODERATE 35: CPT

## 2021-03-30 RX ADMIN — AMLODIPINE BESYLATE 2.5 MILLIGRAM(S): 2.5 TABLET ORAL at 09:16

## 2021-03-30 RX ADMIN — APIXABAN 2.5 MILLIGRAM(S): 2.5 TABLET, FILM COATED ORAL at 09:16

## 2021-03-30 RX ADMIN — Medication 50 MILLIGRAM(S): at 09:16

## 2021-03-30 RX ADMIN — Medication 40 MILLIGRAM(S): at 06:08

## 2021-03-30 RX ADMIN — Medication 100 MILLIGRAM(S): at 21:47

## 2021-03-30 RX ADMIN — APIXABAN 2.5 MILLIGRAM(S): 2.5 TABLET, FILM COATED ORAL at 21:47

## 2021-03-30 RX ADMIN — Medication 40 MILLIGRAM(S): at 18:26

## 2021-03-30 NOTE — CONSULT NOTE ADULT - SUBJECTIVE AND OBJECTIVE BOX
HPI:    89 y/o [place] [facility] and [facility] for Stage 3a lung adenocarcinoma (left upper lobe) s/p RT (5500 cGy over 20 fractions), history of Afib on Coumadin, HTN, Breast CA (age 55) , CAD admitted for SOB x past month. Dyspnea has worsened and patient was admitted for CHF.         /127      RR 26    T 97.3   100% sat  Lasix 40 mg IV  CXR + PVC w effusions  EKG ST no dynamic ST-T changes  BIPAP 12/5 on 2 L     pt feels better    PAST MEDICAL HX  AFIB atrial fib  Breast CA s/p L mastectomy and RT age 55  HTN hypertension   Mitral valve insufficiency)  Neoplasm of breast   Paroxysmal atrial fibrillation   PVC's (premature ventricular contractions)  PHP primary hyperparathyroid        SURGICAL HX  Hysterectomy  Mastectomy: L  age 55    Family HX  Family history of diabetes mellitus : Son  Family history of psoriasis : Child  Family history of ischemic heart disease      SOCIAL HX  lives alone   independent  nonsmoker     (28 Mar 2021 23:46)      PAST MEDICAL & SURGICAL HISTORY:  Afib    Breast CA    HTN (hypertension)    No significant past surgical history        Allergies    Biaxin (Other)  penicillins (Other)    Intolerances        MEDICATIONS  (STANDING):  amLODIPine   Tablet 2.5 milliGRAM(s) Oral daily  apixaban 2.5 milliGRAM(s) Oral every 12 hours  furosemide   Injectable 40 milliGRAM(s) IV Push every 12 hours  metoprolol tartrate 100 milliGRAM(s) Oral at bedtime  metoprolol tartrate 50 milliGRAM(s) Oral daily    MEDICATIONS  (PRN):  acetaminophen   Tablet .. 650 milliGRAM(s) Oral every 6 hours PRN Temp greater or equal to 38C (100.4F), Mild Pain (1 - 3)      FAMILY HISTORY:      SOCIAL HISTORY: No EtOH, no tobacco    REVIEW OF SYSTEMS:    CONSTITUTIONAL: No weakness, fevers or chills  EYES/ENT: No visual changes;  No vertigo or throat pain   NECK: No pain or stiffness  RESPIRATORY: No cough, wheezing, hemoptysis; No shortness of breath  CARDIOVASCULAR: No chest pain or palpitations  GASTROINTESTINAL: No abdominal or epigastric pain. No nausea, vomiting, or hematemesis; No diarrhea or constipation. No melena or hematochezia.  GENITOURINARY: No dysuria, frequency or hematuria  NEUROLOGICAL: No numbness or weakness  SKIN: No itching, burning, rashes, or lesions   All other review of systems is negative unless indicated above.        T(F): 98.3 (03-30-21 @ 20:08), Max: 98.3 (03-30-21 @ 16:19)  HR: 94 (03-30-21 @ 20:00)  BP: 127/67 (03-30-21 @ 19:00)  RR: 24 (03-30-21 @ 20:00)  SpO2: 96% (03-30-21 @ 15:09)  Wt(kg): --    GENERAL: NAD, well-developed  HEAD:  Atraumatic, Normocephalic  EYES: EOMI, PERRLA, conjunctiva and sclera clear  NECK: Supple, No JVD  CHEST/LUNG: Clear to auscultation bilaterally; No wheeze  HEART: Regular rate and rhythm; No murmurs, rubs, or gallops  ABDOMEN: Soft, Nontender, Nondistended; Bowel sounds present  EXTREMITIES:  2+ Peripheral Pulses, No clubbing, cyanosis, or edema  NEUROLOGY: non-focal  SKIN: No rashes or lesions        03-30    138  |  102  |  34<H>  ----------------------------<  88  4.4   |  33<H>  |  1.27    Ca    10.0      30 Mar 2021 06:44            PT/INR - ( 29 Mar 2021 06:23 )   PT: 38.1 sec;   INR: 3.45 ratio

## 2021-03-30 NOTE — PROGRESS NOTE ADULT - SUBJECTIVE AND OBJECTIVE BOX
89 year old female w hx AFIB on AC,  remote Breast CA s/p L mastectomy, CONNOR Lung CA, HTN, primary PHP presented to ED by EMS c/o increased SOB  Was  adm for CHF exacerbation, on BIPAP and made full recovery. Feels much better, not with minimal SOB     Vital Signs Last 24 Hrs  T(C): 36.4 (30 Mar 2021 09:13), Max: 36.4 (30 Mar 2021 09:13)  T(F): 97.6 (30 Mar 2021 09:13), Max: 97.6 (30 Mar 2021 09:13)  HR: 100 (30 Mar 2021 10:00) (78 - 115)  BP: 141/79 (30 Mar 2021 10:00) (111/64 - 155/93)  BP(mean): 95 (30 Mar 2021 10:00) (74 - 133)  RR: 22 (30 Mar 2021 10:00) (17 - 32)  SpO2: 97% (30 Mar 2021 10:00) (92% - 100%)        Input/Output      03-29-21 @ 07:01  -  03-30-21 @ 07:00  --------------------------------------------------------  IN: 440 mL / OUT: 350 mL / NET: 90 mL    03-30-21 @ 07:01  -  03-30-21 @ 12:25  --------------------------------------------------------  IN: 260 mL / OUT: 750 mL / NET: -490 mL        PHYSICAL EXAM  General Appearance: cooperative, no acute distress,   HEENT: PERRL, conjunctiva clear, EOM's intact, non injected pharynx, no exudate  Neck: Supple, , no adenopathy, thyroid: not enlarged, no carotid bruit or JVD  Back: Symmetric, no  tenderness, no soft tissue tenderness  Lungs: better air entry today  Heart: Regular rate and rhythm, S1, S2 normal, no murmur,   Abdomen: Soft, non-tender, bowel sounds active , no hepatosplenomegaly  Extremities: no cyanosis or edema, no joint swelling  Skin: Skin color, texture normal, no rashes   Neurologic: Alert and oriented X3 , cranial nerves intact, sensory and motor normal,                                  14.2   13.05 )-----------( 423      ( 28 Mar 2021 18:21 )             44.7     03-30    138  |  102  |  34<H>  ----------------------------<  88  4.4   |  33<H>  |  1.27    Ca    10.0      30 Mar 2021 06:44  Mg     2.6     03-28    TPro  7.8  /  Alb  3.3  /  TBili  0.4  /  DBili  x   /  AST  36  /  ALT  36  /  AlkPhos  110  03-28    MEDICATIONS  (STANDING):  amLODIPine   Tablet 2.5 milliGRAM(s) Oral daily  apixaban 2.5 milliGRAM(s) Oral every 12 hours  furosemide   Injectable 40 milliGRAM(s) IV Push every 12 hours  metoprolol tartrate 100 milliGRAM(s) Oral at bedtime  metoprolol tartrate 50 milliGRAM(s) Oral daily      89 year old female w hx AFIB on AC,  remote Breast CA s/p L mastectomy, CONNOR Lung CA, HTN, primary PHP presented to ED by EMS c/o increased SOB      #Acute respiratory failure: hypercapnia  #Acute decomp CHF w BNP 11k from 12k  CXR + pleural effusions and PVC; exam +JVD, +rales  distant breath sounds, weak voice, still fluid overloaded  teaching re: chf performed at bedside; pt tired to keep herself well hydrated at home, I explained why that cause a problem  fluid restrict 1200ml/day        #PAF  started on  Eliquis, pt and cardio agree  3. rate control w metoprolol 100 mg q hs w parameters  4. metoprolol 50 mg q AM w parameters      #CONNOR stage 3a adenocarcinoma lung  pt states in [id number] on last scan      #HTN  1.metoprolol as above  2. amlodipine 2.5      #Remote breast CA L mastectomy  1. no BP on that side      #Goals of care  pt wants DNR/DNI      case d/w daughter, poss dc home in 24-48H based on the response to diuretic; pending Echo

## 2021-03-30 NOTE — CHART NOTE - NSCHARTNOTEFT_GEN_A_CORE
HOME O2 EVALUATION    Pulse Ox Room Air Rest:94%    Pulse Ox Room Air Ambulatin% pt did not go below 90%     Pulse Ox on O2          L Ambulating:    Pulse Ox Post Ambulation: 99% on 2lpm resting

## 2021-03-30 NOTE — PROGRESS NOTE ADULT - SUBJECTIVE AND OBJECTIVE BOX
Patient is a 89y old  Female who presents with a chief complaint of acute resp failure, hypercarbic  acute CHF (29 Mar 2021 13:38)      HPI:  89 year old female w hx AFIB on AC,  remote Breast CA s/p L mastectomy, CONNOR Lung CA, HTN, primary PHP presented to ED by EMS c/o increased SOB    Pt has had + SOB x past month  Increased dramatically today  denied hx CHF  no fever or chills  Ambulance call report not available for review  3/29 Cardiology: 89 year old female with history of afib, permanent has been dyspneic for several months. was given albuterol and lasix. Over last several weeks has been feeling more dyspneic. Became severely dyspneic last several days. No definite pnd or orthopnea. no le edema. no chest pain or palps.  3/30 still dyspneic with exertion, oob to chair    /127      RR 26    T 97.3   100% sat  Lasix 40 mg IV  CXR + PVC w effusions  EKG ST no dynamic ST-T changes  BIPAP 12/5 on 2 L     pt feels better    PAST MEDICAL HX  AFIB atrial fib  Breast CA s/p L mastectomy and RT age 55  HTN hypertension   Mitral valve insufficiency)  Neoplasm of breast   Paroxysmal atrial fibrillation   PVC's (premature ventricular contractions)  PHP primary hyperparathyroid        SURGICAL HX  Hysterectomy  Mastectomy: L  age 55    Family HX  Family history of diabetes mellitus : Son  Family history of psoriasis : Child  Family history of ischemic heart disease      SOCIAL HX  lives alone   independent  nonsmoker     (28 Mar 2021 23:46)      PAST MEDICAL & SURGICAL HISTORY:  Afib    Breast CA    HTN (hypertension)    No significant past surgical history          MEDICATIONS  (STANDING):  amLODIPine   Tablet 2.5 milliGRAM(s) Oral daily  apixaban 2.5 milliGRAM(s) Oral every 12 hours  furosemide   Injectable 40 milliGRAM(s) IV Push every 12 hours  metoprolol tartrate 100 milliGRAM(s) Oral at bedtime  metoprolol tartrate 50 milliGRAM(s) Oral daily    MEDICATIONS  (PRN):  acetaminophen   Tablet .. 650 milliGRAM(s) Oral every 6 hours PRN Temp greater or equal to 38C (100.4F), Mild Pain (1 - 3)          Vital Signs Last 24 Hrs  T(C): 35.6 (30 Mar 2021 06:20), Max: 36.8 (29 Mar 2021 09:48)  T(F): 96 (30 Mar 2021 06:20), Max: 98.3 (29 Mar 2021 09:48)  HR: 93 (30 Mar 2021 07:00) (78 - 140)  BP: 155/93 (30 Mar 2021 07:00) (111/64 - 155/93)  BP(mean): 110 (30 Mar 2021 07:00) (73 - 133)  RR: 30 (30 Mar 2021 07:00) (17 - 32)  SpO2: 99% (30 Mar 2021 07:00) (92% - 100%)    I&O's Summary    29 Mar 2021 07:01  -  30 Mar 2021 07:00  --------------------------------------------------------  IN: 440 mL / OUT: 350 mL / NET: 90 mL        PHYSICAL EXAM  General Appearance: comfortable  HEENT:   Neck:   Back:   Lungs: dec bs bases  Heart: [id number]  Abdomen:   Extremities: trace edema  Skin:   Neurologic:       INTERPRETATION OF TELEMETRY:    ECG:        LABS:                          14.2   13.05 )-----------( 423      ( 28 Mar 2021 18:21 )             44.7     0330    138  |  102  |  34<H>  ----------------------------<  88  4.4   |  33<H>  |  1.27    Ca    10.0      30 Mar 2021 06:44  Mg     2.6         TPro  7.8  /  Alb  3.3  /  TBili  0.4  /  DBili  x   /  AST  36  /  ALT  36  /  AlkPhos  110  0328    CARDIAC MARKERS ( 29 Mar 2021 06:23 )  0.168 ng/mL / x     / x     / x     / x      CARDIAC MARKERS ( 28 Mar 2021 18:21 )  0.036 ng/mL / x     / x     / x     / x            Pro BNP  [id number]  @ 06:44  D Dimer  --  @ 06:44  Pro BNP  [id number]  @ 18:21  D Dimer  --  @ 18:21    PT/INR - ( 29 Mar 2021 06:23 )   PT: 38.1 sec;   INR: 3.45 ratio           Urinalysis Basic - ( 29 Mar 2021 06:59 )    Color: Yellow / Appearance: Clear / S.015 / pH: x  Gluc: x / Ketone: Negative  / Bili: Negative / Urobili: Negative mg/dL   Blood: x / Protein: 30 mg/dL / Nitrite: Negative   Leuk Esterase: Negative / RBC: Negative /HPF / WBC Negative   Sq Epi: x / Non Sq Epi: Negative / Bacteria: Negative            RADIOLOGY & ADDITIONAL STUDIES:

## 2021-03-31 LAB
ANION GAP SERPL CALC-SCNC: 6 MMOL/L — SIGNIFICANT CHANGE UP (ref 5–17)
BUN SERPL-MCNC: 35 MG/DL — HIGH (ref 7–23)
CALCIUM SERPL-MCNC: 9.6 MG/DL — SIGNIFICANT CHANGE UP (ref 8.5–10.1)
CHLORIDE SERPL-SCNC: 104 MMOL/L — SIGNIFICANT CHANGE UP (ref 96–108)
CO2 SERPL-SCNC: 33 MMOL/L — HIGH (ref 22–31)
CREAT SERPL-MCNC: 1.25 MG/DL — SIGNIFICANT CHANGE UP (ref 0.5–1.3)
GLUCOSE SERPL-MCNC: 96 MG/DL — SIGNIFICANT CHANGE UP (ref 70–99)
HCT VFR BLD CALC: 40.4 % — SIGNIFICANT CHANGE UP (ref 34.5–45)
HGB BLD-MCNC: 13 G/DL — SIGNIFICANT CHANGE UP (ref 11.5–15.5)
MCHC RBC-ENTMCNC: 29 PG — SIGNIFICANT CHANGE UP (ref 27–34)
MCHC RBC-ENTMCNC: 32.2 GM/DL — SIGNIFICANT CHANGE UP (ref 32–36)
MCV RBC AUTO: 90.2 FL — SIGNIFICANT CHANGE UP (ref 80–100)
NT-PROBNP SERPL-SCNC: HIGH PG/ML (ref 0–450)
PLATELET # BLD AUTO: 313 K/UL — SIGNIFICANT CHANGE UP (ref 150–400)
POTASSIUM SERPL-MCNC: 3.8 MMOL/L — SIGNIFICANT CHANGE UP (ref 3.5–5.3)
POTASSIUM SERPL-SCNC: 3.8 MMOL/L — SIGNIFICANT CHANGE UP (ref 3.5–5.3)
RBC # BLD: 4.48 M/UL — SIGNIFICANT CHANGE UP (ref 3.8–5.2)
RBC # FLD: 13.6 % — SIGNIFICANT CHANGE UP (ref 10.3–14.5)
SODIUM SERPL-SCNC: 143 MMOL/L — SIGNIFICANT CHANGE UP (ref 135–145)
WBC # BLD: 9.25 K/UL — SIGNIFICANT CHANGE UP (ref 3.8–10.5)
WBC # FLD AUTO: 9.25 K/UL — SIGNIFICANT CHANGE UP (ref 3.8–10.5)

## 2021-03-31 PROCEDURE — 93306 TTE W/DOPPLER COMPLETE: CPT | Mod: 26

## 2021-03-31 PROCEDURE — 99232 SBSQ HOSP IP/OBS MODERATE 35: CPT

## 2021-03-31 RX ADMIN — Medication 40 MILLIGRAM(S): at 17:14

## 2021-03-31 RX ADMIN — Medication 100 MILLIGRAM(S): at 21:14

## 2021-03-31 RX ADMIN — APIXABAN 2.5 MILLIGRAM(S): 2.5 TABLET, FILM COATED ORAL at 09:40

## 2021-03-31 RX ADMIN — Medication 40 MILLIGRAM(S): at 05:59

## 2021-03-31 RX ADMIN — Medication 50 MILLIGRAM(S): at 09:40

## 2021-03-31 RX ADMIN — APIXABAN 2.5 MILLIGRAM(S): 2.5 TABLET, FILM COATED ORAL at 21:14

## 2021-03-31 RX ADMIN — AMLODIPINE BESYLATE 2.5 MILLIGRAM(S): 2.5 TABLET ORAL at 09:39

## 2021-03-31 NOTE — PROGRESS NOTE ADULT - SUBJECTIVE AND OBJECTIVE BOX
INTERVAL HPI/OVERNIGHT EVENTS:  Patient S&E at bedside. No o/n events, patient resting comfortably. No complaints at this time.   VITAL SIGNS:  T(F): 97.9 (03-31-21 @ 19:59)  HR: 117 (03-31-21 @ 18:00)  BP: 142/93 (03-31-21 @ 18:00)  RR: 23 (03-31-21 @ 18:00)  SpO2: 95% (03-31-21 @ 18:00)  Wt(kg): --    PHYSICAL EXAM:    Constitutional: NAD  Eyes: EOMI, sclera non-icteric  Neck: supple, no masses, no JVD  Respiratory: CTA b/l, good air entry b/l  Cardiovascular: RRR, no M/R/G  Gastrointestinal: soft, NTND, no masses palpable, + BS, no hepatosplenomegaly  Extremities: no c/c/e  Neurological: AAOx3      MEDICATIONS  (STANDING):  amLODIPine   Tablet 2.5 milliGRAM(s) Oral daily  apixaban 2.5 milliGRAM(s) Oral every 12 hours  furosemide   Injectable 40 milliGRAM(s) IV Push every 12 hours  metoprolol tartrate 100 milliGRAM(s) Oral at bedtime  metoprolol tartrate 50 milliGRAM(s) Oral daily    MEDICATIONS  (PRN):  acetaminophen   Tablet .. 650 milliGRAM(s) Oral every 6 hours PRN Temp greater or equal to 38C (100.4F), Mild Pain (1 - 3)      Allergies    Biaxin (Other)  penicillins (Other)    Intolerances        LABS:                        13.0   9.25  )-----------( 313      ( 31 Mar 2021 06:17 )             40.4     03-31    143  |  104  |  35<H>  ----------------------------<  96  3.8   |  33<H>  |  1.25    Ca    9.6      31 Mar 2021 06:17            RADIOLOGY & ADDITIONAL TESTS:  Studies reviewed.    ASSESSMENT & PLAN:

## 2021-03-31 NOTE — PROGRESS NOTE ADULT - SUBJECTIVE AND OBJECTIVE BOX
89 year old female w hx AFIB on AC,  remote Breast CA s/p L mastectomy, CONNOR Lung CA, HTN, primary PHP presented to ED by EMS c/o increased SOB  Was  adm for CHF exacerbation, on BIPAP and made full recovery. Feels much better,  minimal SOB     Vital Signs Last 24 Hrs  T(C): 36.2 (31 Mar 2021 07:58), Max: 36.8 (30 Mar 2021 16:19)  T(F): 97.1 (31 Mar 2021 07:58), Max: 98.3 (30 Mar 2021 16:19)  HR: 109 (31 Mar 2021 11:13) (80 - 109)  BP: 123/78 (31 Mar 2021 11:13) (112/53 - 160/99)  BP(mean): 87 (31 Mar 2021 11:13) (65 - 116)  RR: 24 (31 Mar 2021 11:13) (19 - 34)  SpO2: 95% (31 Mar 2021 10:00) (91% - 100%)          03-30-21 @ 07:01  -  03-31-21 @ 07:00  --------------------------------------------------------  IN: 660 mL / OUT: 1500 mL / NET: -840 mL    03-31-21 @ 07:01  -  03-31-21 @ 13:07  --------------------------------------------------------  IN: 120 mL / OUT: 350 mL / NET: -230 mL        PHYSICAL EXAM  General Appearance: cooperative, no acute distress,   HEENT: PERRL, conjunctiva clear, EOM's intact, non injected pharynx, no exudate  Neck: Supple, , no adenopathy, thyroid: not enlarged, no carotid bruit or JVD  Back: Symmetric, no  tenderness, no soft tissue tenderness  Lungs: better air entry today  Heart: Regular rate and rhythm, S1, S2 normal, no murmur,   Abdomen: Soft, non-tender, bowel sounds active , no hepatosplenomegaly  Extremities: no cyanosis or edema, no joint swelling  Skin: Skin color, texture normal, no rashes   Neurologic: Alert and oriented X3 , cranial nerves intact, sensory and motor normal,                                                    13.0   9.25  )-----------( 313      ( 31 Mar 2021 06:17 )             40.4   03-31    143  |  104  |  35<H>  ----------------------------<  96  3.8   |  33<H>  |  1.25    Ca    9.6      31 Mar 2021 06:17      89 year old female w hx AFIB on AC,  remote Breast CA s/p L mastectomy, CONNOR Lung CA, HTN, primary PHP presented to ED by EMS c/o increased SOB      #Acute respiratory failure: hypercapnia  #Acute decomp CHF w BNP 11k from 12k  CXR + pleural effusions and PVC; exam +JVD, +rales  distant breath sounds, weak voice, still fluid overloaded  teaching re: chf performed at bedside; pt tired to keep herself well hydrated at home, I explained why that cause a problem  fluid restrict 1200ml/day        #PAF  started on  Eliquis, pt and cardio agree  3. rate control w metoprolol 100 mg q hs w parameters  4. metoprolol 50 mg q AM w parameters      #CONNOR stage 3a adenocarcinoma lung  pt states in [id number] on last scan      #HTN  1.metoprolol as above  2. amlodipine 2.5      #Remote breast CA L mastectomy  1. no BP on that side      #Goals of care  pt wants DNR/DNI      case d/w daughter, poss dc home in 24-48H based on the response to diuretic; pending Echo

## 2021-03-31 NOTE — PROGRESS NOTE ADULT - SUBJECTIVE AND OBJECTIVE BOX
Patient is a 89y old  Female who presents with a chief complaint of acute resp failure, hypercarbic  acute CHF (30 Mar 2021 22:05)      HPI:  89 year old female w hx AFIB on AC,  remote Breast CA s/p L mastectomy, CONNOR Lung CA, HTN, primary PHP presented to ED by EMS c/o increased SOB  3/29 Cardiology: 89 year old female with history of afib, permanent has been dyspneic for several months. was given albuterol and lasix. Over last several weeks has been feeling more dyspneic. Became severely dyspneic last several days. No definite pnd or orthopnea. no le edema. no chest pain or palps.  3/30 still dyspneic with exertion, oob to chair  3/31 feels better    Pt has had + SOB x past month  Increased dramatically today  denied hx CHF  no fever or chills  Ambulance call report not available for review      /127      RR 26    T 97.3   100% sat  Lasix 40 mg IV  CXR + PVC w effusions  EKG ST no dynamic ST-T changes  BIPAP 12/5 on 2 L     pt feels better    PAST MEDICAL HX  AFIB atrial fib  Breast CA s/p L mastectomy and RT age 55  HTN hypertension   Mitral valve insufficiency)  Neoplasm of breast   Paroxysmal atrial fibrillation   PVC's (premature ventricular contractions)  PHP primary hyperparathyroid        SURGICAL HX  Hysterectomy  Mastectomy: L  age 55    Family HX  Family history of diabetes mellitus : Son  Family history of psoriasis : Child  Family history of ischemic heart disease      SOCIAL HX  lives alone   independent  nonsmoker     (28 Mar 2021 23:46)      PAST MEDICAL & SURGICAL HISTORY:  Afib    Breast CA    HTN (hypertension)    No significant past surgical history          MEDICATIONS  (STANDING):  amLODIPine   Tablet 2.5 milliGRAM(s) Oral daily  apixaban 2.5 milliGRAM(s) Oral every 12 hours  furosemide   Injectable 40 milliGRAM(s) IV Push every 12 hours  metoprolol tartrate 100 milliGRAM(s) Oral at bedtime  metoprolol tartrate 50 milliGRAM(s) Oral daily    MEDICATIONS  (PRN):  acetaminophen   Tablet .. 650 milliGRAM(s) Oral every 6 hours PRN Temp greater or equal to 38C (100.4F), Mild Pain (1 - 3)          Vital Signs Last 24 Hrs  T(C): 36.2 (31 Mar 2021 07:58), Max: 36.8 (30 Mar 2021 16:19)  T(F): 97.1 (31 Mar 2021 07:58), Max: 98.3 (30 Mar 2021 16:19)  HR: 89 (31 Mar 2021 06:00) (80 - 108)  BP: 156/68 (31 Mar 2021 06:00) (127/67 - 156/68)  BP(mean): 90 (31 Mar 2021 06:00) (65 - 109)  RR: 30 (31 Mar 2021 06:00) (19 - 34)  SpO2: 95% (31 Mar 2021 06:00) (91% - 100%)    I&O's Summary    30 Mar 2021 07:01  -  31 Mar 2021 07:00  --------------------------------------------------------  IN: 660 mL / OUT: 1500 mL / NET: -840 mL        PHYSICAL EXAM  General Appearance: comfortable  HEENT:   Neck:   Back:   Lungs: clr  Heart: [id number]  Abdomen:   Extremities: trace edema  Skin:   Neurologic:         INTERPRETATION OF TELEMETRY:    ECG:        LABS:                          13.0   9.25  )-----------( 313      ( 31 Mar 2021 06:17 )             40.4     03-31    143  |  104  |  35<H>  ----------------------------<  96  3.8   |  33<H>  |  1.25    Ca    9.6      31 Mar 2021 06:17            Pro BNP  [id number] 03-31 @ 06:17  D Dimer  -- 03-31 @ 06:17  Pro BNP  [id number] 03-30 @ 06:44  D Dimer  -- 03-30 @ 06:44  Pro BNP  [id number] 03-28 @ 18:21  D Dimer  -- 03-28 @ 18:21              RADIOLOGY & ADDITIONAL STUDIES:

## 2021-04-01 PROCEDURE — 71045 X-RAY EXAM CHEST 1 VIEW: CPT | Mod: 26

## 2021-04-01 PROCEDURE — 99233 SBSQ HOSP IP/OBS HIGH 50: CPT

## 2021-04-01 RX ORDER — FUROSEMIDE 40 MG
40 TABLET ORAL DAILY
Refills: 0 | Status: DISCONTINUED | OUTPATIENT
Start: 2021-04-01 | End: 2021-04-03

## 2021-04-01 RX ORDER — METOPROLOL TARTRATE 50 MG
100 TABLET ORAL DAILY
Refills: 0 | Status: DISCONTINUED | OUTPATIENT
Start: 2021-04-01 | End: 2021-04-03

## 2021-04-01 RX ADMIN — Medication 40 MILLIGRAM(S): at 05:22

## 2021-04-01 RX ADMIN — APIXABAN 2.5 MILLIGRAM(S): 2.5 TABLET, FILM COATED ORAL at 21:24

## 2021-04-01 RX ADMIN — Medication 100 MILLIGRAM(S): at 19:05

## 2021-04-01 RX ADMIN — Medication 100 MILLIGRAM(S): at 11:03

## 2021-04-01 NOTE — PROGRESS NOTE ADULT - SUBJECTIVE AND OBJECTIVE BOX
INTERVAL HPI/OVERNIGHT EVENTS:  Patient S&E at bedside. No o/n events, patient resting comfortably. No complaints at this time.   VITAL SIGNS:  T(F): 97.9 (04-01-21 @ 13:31)  HR: 95 (04-01-21 @ 12:00)  BP: 144/52 (04-01-21 @ 12:00)  RR: 19 (04-01-21 @ 12:00)  SpO2: 94% (04-01-21 @ 12:00)  Wt(kg): --    PHYSICAL EXAM:    Constitutional: NAD  Eyes: EOMI, sclera non-icteric  Neck: supple, no masses, no JVD  Respiratory: CTA b/l, good air entry b/l  Cardiovascular: RRR, no M/R/G  Gastrointestinal: soft, NTND, no masses palpable, + BS, no hepatosplenomegaly  Extremities: no c/c/e  Neurological: AAOx3      MEDICATIONS  (STANDING):  amLODIPine   Tablet 2.5 milliGRAM(s) Oral daily  apixaban 2.5 milliGRAM(s) Oral every 12 hours  furosemide    Tablet 40 milliGRAM(s) Oral daily  metoprolol tartrate 100 milliGRAM(s) Oral daily  metoprolol tartrate 100 milliGRAM(s) Oral at bedtime    MEDICATIONS  (PRN):  acetaminophen   Tablet .. 650 milliGRAM(s) Oral every 6 hours PRN Temp greater or equal to 38C (100.4F), Mild Pain (1 - 3)      Allergies    Biaxin (Other)  penicillins (Other)    Intolerances        LABS:                        13.0   9.25  )-----------( 313      ( 31 Mar 2021 06:17 )             40.4     03-31    143  |  104  |  35<H>  ----------------------------<  96  3.8   |  33<H>  |  1.25    Ca    9.6      31 Mar 2021 06:17            RADIOLOGY & ADDITIONAL TESTS:  Studies reviewed.    ASSESSMENT & PLAN:

## 2021-04-01 NOTE — CDI QUERY NOTE - [id number]_ALL_CORE_HH
Clinical documentation indicates that this patient has Acute on Chronic CHF.  Please include more specific documentation, either known or suspected, of the acuity and type in your Progress Note and/or Discharge Summary.    Specify ACUITY:  Acute  Chronic  Acute on Chronic  Unspecified     AND    Specify TYPE:  Systolic (HFrEF)  Diastolic (HFpEF)  Combined Systolic (HFrEF)  & Diastolic (HFpEF)  Right Heart Failure  Left Heart Failure  Biventricular Heart Failure (include the systolic and diastolic also)  Other (specify)    SUPPORTING DOCUMENTATION AND/OR CLINICAL EVIDENCE:  ECHOCARDIOGRAM: Left Ventricle   Estimated left ventricular ejection fraction is 40-45 %.  BNP:Serum Pro-Brain Natriuretic Peptide: 89080 pg/mL (03-31-21 @ 06:17)  IV medications Lasix:  lasix 40 mg IV  Medications Cardiovascular (MAR):   furosemide    Tablet 40 milliGRAM(s) Oral daily  Supplemental oxygen: BiPAP 12/5 on 2 L  Medications Cardiovascular (MAR):   furosemide    Tablet 40 milliGRAM(s) Oral daily  Consult- Cardiology:    Reason for Consult: acute CHF  Consult- Cardiology:  Reason for Consult: acute CHF

## 2021-04-01 NOTE — PROGRESS NOTE ADULT - SUBJECTIVE AND OBJECTIVE BOX
Patient is a 89y old  Female who presents with a chief complaint of acute resp failure, hypercarbic  acute CHF (31 Mar 2021 20:14)  89 year old female w hx AFIB on AC,  remote Breast CA s/p L mastectomy, CONNOR Lung CA, HTN, primary PHP presented to ED by EMS c/o increased SOB  3/29 Cardiology: 89 year old female with history of afib, permanent has been dyspneic for several months. was given albuterol and lasix. Over last several weeks has been feeling more dyspneic. Became severely dyspneic last several days. No definite pnd or orthopnea. no le edema. no chest pain or palps.    Followup HPI:  3/30 still dyspneic with exertion, oob to chair  3/31 feels better  4/1- no complaints. Denies shortness of breath.    PAST MEDICAL & SURGICAL HISTORY:  Afib    Breast CA    HTN (hypertension)    No significant past surgical history        Review of symptoms:  Negative except for as noted in today's HPI.      MEDICATIONS  (STANDING):  amLODIPine   Tablet 2.5 milliGRAM(s) Oral daily  apixaban 2.5 milliGRAM(s) Oral every 12 hours  furosemide   Injectable 40 milliGRAM(s) IV Push every 12 hours  metoprolol tartrate 100 milliGRAM(s) Oral at bedtime  metoprolol tartrate 50 milliGRAM(s) Oral daily    MEDICATIONS  (PRN):  acetaminophen   Tablet .. 650 milliGRAM(s) Oral every 6 hours PRN Temp greater or equal to 38C (100.4F), Mild Pain (1 - 3)          Vital Signs Last 24 Hrs  T(C): 36 (01 Apr 2021 04:33), Max: 36.8 (01 Apr 2021 00:41)  T(F): 96.8 (01 Apr 2021 04:33), Max: 98.2 (01 Apr 2021 00:41)  HR: 105 (01 Apr 2021 08:02) (65 - 118)  BP: 122/77 (01 Apr 2021 08:02) (103/52 - 160/99)  BP(mean): 83 (01 Apr 2021 08:02) (61 - 116)  RR: 21 (01 Apr 2021 08:02) (17 - 32)  SpO2: 99% (01 Apr 2021 07:00) (92% - 100%)    I&O's Summary    31 Mar 2021 07:01  -  01 Apr 2021 07:00  --------------------------------------------------------  IN: 120 mL / OUT: 750 mL / NET: -630 mL        PHYSICAL EXAM  General Appearance: comfortable  HEENT:   Neck:   Lungs: clear  Heart: 1/6 systolic murmur LSB  Abdomen:   Extremities: no edema  Neurologic:       INTERPRETATION OF TELEMETRY: AF    ECG:         EXAM:  ECHO TTE WO CON COMP W DOPP       PROCEDURE DATE:  03/31/2021     Summary     Moderate (2+) mitral regurgitation is present.   The mitral valve leaflets appear thickened.   Mild mitral annular calcification is present.   Mild (1+) aortic regurgitation is present.   Mild aortic sclerosis is present with normal valvular opening.   Moderate to severe (3+) tricuspid valve regurgitation is present. Moderate   pulmonary hypertension.   The left atrium is mildly dilated.   Estimated left ventricular ejection fraction is 40-45 %.   Mild to moderately reduced left ventricular systolic function.   The right atrium appears moderately dilated.   Normal appearing right ventricle structure and function.   Pleural effusion - is present.     Signature     ----------------------------------------------------------------   Electronically signed by Gualberto Garcia MD(Interpreting   physician) on 03/31/2021 06:55 PM   ----------------------------------------------------------------      LABS:                          13.0   9.25  )-----------( 313      ( 31 Mar 2021 06:17 )             40.4     03-31    143  |  104  |  35<H>  ----------------------------<  96  3.8   |  33<H>  |  1.25    Ca    9.6      31 Mar 2021 06:17            Pro BNP  [id number] 03-31 @ 06:17  D Dimer  -- 03-31 @ 06:17  Pro BNP  [id number] 03-30 @ 06:44  D Dimer  -- 03-30 @ 06:44  Pro BNP  [id number] 03-28 @ 18:21  D Dimer  -- 03-28 @ 18:21              RADIOLOGY & ADDITIONAL STUDIES:    IMPRESSION:    1. Mild LV systolic dysfunction  CHF rule out lv dysfunction or occult valvular disease  2. Permanent afib rate  reasonable for now  3. HTN  PLAN:  cont lasix IV and bb,eliquis. Avoid ACEI or ARB due to history of hyperkalemia.

## 2021-04-01 NOTE — PROGRESS NOTE ADULT - SUBJECTIVE AND OBJECTIVE BOX
89 year old female w hx AFIB on AC,  remote Breast CA s/p L mastectomy, CONNOR Lung CA, HTN, primary PHP presented to ED by EMS c/o increased SOB  Was  adm for CHF exacerbation, on BIPAP and made full recovery. Feels much better,  minimal SOB but when she ambulates she is SOB and HR goes >120, AF  CXR shows marked improvement in the magnitude of the CHF    Meds:    acetaminophen   Tablet .. 650 milliGRAM(s) Oral every 6 hours PRN  amLODIPine   Tablet 2.5 milliGRAM(s) Oral daily  apixaban 2.5 milliGRAM(s) Oral every 12 hours  furosemide    Tablet 40 milliGRAM(s) Oral daily  metoprolol tartrate 100 milliGRAM(s) Oral daily  metoprolol tartrate 100 milliGRAM(s) Oral at bedtime      T(C): 36 (04-01-21 @ 08:35), Max: 36.8 (04-01-21 @ 00:41)  HR: 105 (04-01-21 @ 08:02) (65 - 118)  BP: 122/77 (04-01-21 @ 08:02) (103/52 - 145/71)  RR: 21 (04-01-21 @ 08:02) (17 - 32)  SpO2: 99% (04-01-21 @ 07:00) (92% - 100%)    Input/Output      03-31-21 @ 07:01  -  04-01-21 @ 07:00  --------------------------------------------------------  IN: 120 mL / OUT: 750 mL / NET: -630 mL    04-01-21 @ 07:01  -  04-01-21 @ 10:38  --------------------------------------------------------  IN: 0 mL / OUT: 500 mL / NET: -500 mL            ROS:     Gen: no fever  Cardiovascular: no chest pain  Respiratory: no cough, no sputum  Gastrointestinal: no nausea, no vomiting, no change in bowel habits  Neurologic: no headache  : no urinary symptoms          HEENT: PERRL, conjunctiva clear, EOM's intact, non injected pharynx, no exudate  Neck: Supple, , no adenopathy, thyroid: not enlarged, no carotid bruit or JVD  Back: Symmetric, no  tenderness, no soft tissue tenderness  Lungs: better air entry today  Heart: Regular rate and rhythm, S1, S2 normal, no murmur,   Abdomen: Soft, non-tender, bowel sounds active , no hepatosplenomegaly  Extremities: no cyanosis or edema, no joint swelling  Skin: Skin color, texture normal, no rashes   Neurologic: Alert and oriented X3 , cranial nerves intact, sensory and motor normal,     No labs today                 Echo:    Moderate (2+) mitral regurgitation is present.   The mitral valve leaflets appear thickened.   Mild mitral annular calcification is present.   Mild (1+) aortic regurgitation is present.   Mild aortic sclerosis is present with normal valvular opening.   Moderate to severe (3+) tricuspid valve regurgitation is present. Moderate   pulmonary hypertension.   The left atrium is mildly dilated.   Estimated left ventricular ejection fraction is 40-45 %.   Mild to moderately reduced left ventricular systolic function.   The right atrium appears moderately dilated.   Normal appearing right ventricle structure and function.   Pleural effusion - is present.       89 year old female w hx AFIB on AC,  remote Breast CA s/p L mastectomy, CONNOR Lung CA, HTN, primary PHP presented to ED by EMS c/o increased SOB      #Acute respiratory failure: hypercapnia  #Acute decomp CHF w BNP 11k from 12k  CXR + pleural effusions and PVC; exam +JVD, +rales on admission now resolved  Echo noted, multiple worrisome findings including the severe TR along with reduced EF and pt is unable to use ARB, ACEi due to tendency to develop hyperkalemia  fluid restrict 1200ml/day        #PAF: HR>120 ambulating  started on  Eliquis, pt and cardio agree  increase Metoprolol to 100 bid from 50 AM 100PM      #CONNOR stage 3a adenocarcinoma lung  pt states in [id number] on last scan        #Remote breast CA L mastectomy  1. no BP on that side      #Goals of care  pt wants DNR/DNI      case d/w daughter, remains hospitalized due to AF RVR when ambulating           89 year old female w hx AFIB on AC,  remote Breast CA s/p L mastectomy, CONNOR Lung CA, HTN, primary PHP presented to ED by EMS c/o increased SOB  Was  adm for CHF exacerbation, on BIPAP and made full recovery. Feels much better,  minimal SOB but when she ambulates she is SOB and HR goes >120, AF  CXR shows marked improvement in the magnitude of the CHF    Meds:    acetaminophen   Tablet .. 650 milliGRAM(s) Oral every 6 hours PRN  amLODIPine   Tablet 2.5 milliGRAM(s) Oral daily  apixaban 2.5 milliGRAM(s) Oral every 12 hours  furosemide    Tablet 40 milliGRAM(s) Oral daily  metoprolol tartrate 100 milliGRAM(s) Oral daily  metoprolol tartrate 100 milliGRAM(s) Oral at bedtime      T(C): 36 (04-01-21 @ 08:35), Max: 36.8 (04-01-21 @ 00:41)  HR: 105 (04-01-21 @ 08:02) (65 - 118)  BP: 122/77 (04-01-21 @ 08:02) (103/52 - 145/71)  RR: 21 (04-01-21 @ 08:02) (17 - 32)  SpO2: 99% (04-01-21 @ 07:00) (92% - 100%)    Input/Output      03-31-21 @ 07:01  -  04-01-21 @ 07:00  --------------------------------------------------------  IN: 120 mL / OUT: 750 mL / NET: -630 mL    04-01-21 @ 07:01  -  04-01-21 @ 10:38  --------------------------------------------------------  IN: 0 mL / OUT: 500 mL / NET: -500 mL            ROS:     Gen: no fever  Cardiovascular: no chest pain  Respiratory: no cough, no sputum  Gastrointestinal: no nausea, no vomiting, no change in bowel habits  Neurologic: no headache  : no urinary symptoms          HEENT: PERRL, conjunctiva clear, EOM's intact, non injected pharynx, no exudate  Neck: Supple, , no adenopathy, thyroid: not enlarged, no carotid bruit or JVD  Back: Symmetric, no  tenderness, no soft tissue tenderness  Lungs: better air entry today  Heart: Regular rate and rhythm, S1, S2 normal, no murmur,   Abdomen: Soft, non-tender, bowel sounds active , no hepatosplenomegaly  Extremities: no cyanosis or edema, no joint swelling  Skin: Skin color, texture normal, no rashes   Neurologic: Alert and oriented X3 , cranial nerves intact, sensory and motor normal,     No labs today                 Echo:    Moderate (2+) mitral regurgitation is present.   The mitral valve leaflets appear thickened.   Mild mitral annular calcification is present.   Mild (1+) aortic regurgitation is present.   Mild aortic sclerosis is present with normal valvular opening.   Moderate to severe (3+) tricuspid valve regurgitation is present. Moderate   pulmonary hypertension.   The left atrium is mildly dilated.   Estimated left ventricular ejection fraction is 40-45 %.   Mild to moderately reduced left ventricular systolic function.   The right atrium appears moderately dilated.   Normal appearing right ventricle structure and function.   Pleural effusion - is present.       89 year old female w hx AFIB on AC,  remote Breast CA s/p L mastectomy, CONNOR Lung CA, HTN, primary PHP presented to ED by EMS c/o increased SOB        #Acute systolic  HF w BNP 11k from 12k  CXR + pleural effusions and PVC; exam +JVD, +rales on admission now resolved  Echo noted, multiple worrisome findings including the severe TR along with reduced EF and pt is unable to use ARB, ACEi due to tendency to develop hyperkalemia  fluid restrict 1200ml/day        #PAF: HR>120 ambulating  started on  Eliquis, pt and cardio agree  increase Metoprolol to 100 bid from 50 AM 100PM      #CONNOR stage 3a adenocarcinoma lung  pt states in [id number] on last scan        #Remote breast CA L mastectomy  1. no BP on that side      #Goals of care  pt wants DNR/DNI      case d/w daughter, remains hospitalized due to AF RVR when ambulating

## 2021-04-02 LAB
ANION GAP SERPL CALC-SCNC: 5 MMOL/L — SIGNIFICANT CHANGE UP (ref 5–17)
BUN SERPL-MCNC: 34 MG/DL — HIGH (ref 7–23)
CALCIUM SERPL-MCNC: 10.1 MG/DL — SIGNIFICANT CHANGE UP (ref 8.5–10.1)
CHLORIDE SERPL-SCNC: 104 MMOL/L — SIGNIFICANT CHANGE UP (ref 96–108)
CO2 SERPL-SCNC: 29 MMOL/L — SIGNIFICANT CHANGE UP (ref 22–31)
CREAT SERPL-MCNC: 1.45 MG/DL — HIGH (ref 0.5–1.3)
GLUCOSE SERPL-MCNC: 95 MG/DL — SIGNIFICANT CHANGE UP (ref 70–99)
POTASSIUM SERPL-MCNC: 3.7 MMOL/L — SIGNIFICANT CHANGE UP (ref 3.5–5.3)
POTASSIUM SERPL-SCNC: 3.7 MMOL/L — SIGNIFICANT CHANGE UP (ref 3.5–5.3)
SODIUM SERPL-SCNC: 138 MMOL/L — SIGNIFICANT CHANGE UP (ref 135–145)

## 2021-04-02 PROCEDURE — 99233 SBSQ HOSP IP/OBS HIGH 50: CPT

## 2021-04-02 RX ORDER — SODIUM CHLORIDE 9 MG/ML
500 INJECTION, SOLUTION INTRAVENOUS
Refills: 0 | Status: DISCONTINUED | OUTPATIENT
Start: 2021-04-02 | End: 2021-04-03

## 2021-04-02 RX ORDER — DILTIAZEM HCL 120 MG
30 CAPSULE, EXT RELEASE 24 HR ORAL THREE TIMES A DAY
Refills: 0 | Status: DISCONTINUED | OUTPATIENT
Start: 2021-04-02 | End: 2021-04-03

## 2021-04-02 RX ADMIN — APIXABAN 2.5 MILLIGRAM(S): 2.5 TABLET, FILM COATED ORAL at 21:14

## 2021-04-02 RX ADMIN — Medication 100 MILLIGRAM(S): at 21:14

## 2021-04-02 RX ADMIN — Medication 40 MILLIGRAM(S): at 05:12

## 2021-04-02 RX ADMIN — Medication 30 MILLIGRAM(S): at 14:27

## 2021-04-02 RX ADMIN — Medication 30 MILLIGRAM(S): at 08:53

## 2021-04-02 RX ADMIN — Medication 30 MILLIGRAM(S): at 21:14

## 2021-04-02 RX ADMIN — APIXABAN 2.5 MILLIGRAM(S): 2.5 TABLET, FILM COATED ORAL at 10:27

## 2021-04-02 RX ADMIN — SODIUM CHLORIDE 80 MILLILITER(S): 9 INJECTION, SOLUTION INTRAVENOUS at 10:27

## 2021-04-02 NOTE — PROGRESS NOTE ADULT - SUBJECTIVE AND OBJECTIVE BOX
INTERVAL HPI/OVERNIGHT EVENTS:  Patient S&E at bedside.    Now with ongoing tachycardia   no shortness of breath     VITAL SIGNS:  T(F): 97.5 (04-02-21 @ 11:58)  HR: 71 (04-02-21 @ 12:00)  BP: 136/57 (04-02-21 @ 12:00)  RR: 23 (04-02-21 @ 12:00)  SpO2: 93% (04-02-21 @ 12:00)  Wt(kg): --    PHYSICAL EXAM:    Constitutional: NAD  Eyes: EOMI, sclera non-icteric  Neck: supple, no masses, no JVD  Respiratory: CTA b/l, good air entry b/l  Cardiovascular: RRR, no M/R/G  Gastrointestinal: soft, NTND, no masses palpable, + BS, no hepatosplenomegaly  Extremities: no c/c/e  Neurological: AAOx3      MEDICATIONS  (STANDING):  apixaban 2.5 milliGRAM(s) Oral every 12 hours  diltiazem    Tablet 30 milliGRAM(s) Oral three times a day  furosemide    Tablet 40 milliGRAM(s) Oral daily  metoprolol tartrate 100 milliGRAM(s) Oral daily  metoprolol tartrate 100 milliGRAM(s) Oral at bedtime  sodium chloride 0.45%. 500 milliLiter(s) (80 mL/Hr) IV Continuous <Continuous>    MEDICATIONS  (PRN):  acetaminophen   Tablet .. 650 milliGRAM(s) Oral every 6 hours PRN Temp greater or equal to 38C (100.4F), Mild Pain (1 - 3)      Allergies    Biaxin (Other)  penicillins (Other)    Intolerances        LABS:    04-02    138  |  104  |  34<H>  ----------------------------<  95  3.7   |  29  |  1.45<H>    Ca    10.1      02 Apr 2021 07:50            RADIOLOGY & ADDITIONAL TESTS:  Studies reviewed.    ASSESSMENT & PLAN:

## 2021-04-02 NOTE — PROGRESS NOTE ADULT - SUBJECTIVE AND OBJECTIVE BOX
89 year old female w hx AFIB on AC,  remote Breast CA s/p L mastectomy, CONNOR Lung CA, HTN, primary PHP presented to ED by EMS c/o increased SOB  Was  adm for CHF exacerbation, on BIPAP and made full recovery. Feels much better,  minimal SOB but when she ambulates she is SOB and HR goes >120, AF  CXR shows marked improvement in the magnitude of the CHF but she now has marked RVR when she ambulates ( sometimes at rest too, but less frequent then when ambulating.      Meds:    acetaminophen   Tablet .. 650 milliGRAM(s) Oral every 6 hours PRN  apixaban 2.5 milliGRAM(s) Oral every 12 hours  diltiazem    Tablet 30 milliGRAM(s) Oral three times a day  furosemide    Tablet 40 milliGRAM(s) Oral daily  metoprolol tartrate 100 milliGRAM(s) Oral daily  metoprolol tartrate 100 milliGRAM(s) Oral at bedtime  sodium chloride 0.45%. 500 milliLiter(s) IV Continuous <Continuous>      T(C): 36.4 (04-02-21 @ 11:58), Max: 37 (04-01-21 @ 17:45)  HR: 71 (04-02-21 @ 12:00) (71 - 120)  BP: 136/57 (04-02-21 @ 12:00) (115/53 - 136/57)  RR: 23 (04-02-21 @ 12:00) (16 - 30)  SpO2: 93% (04-02-21 @ 12:00) (91% - 96%)    Input/Output      04-01-21 @ 07:01  -  04-02-21 @ 07:00  --------------------------------------------------------  IN: 0 mL / OUT: 1451 mL / NET: -1451 mL    04-02-21 @ 07:01  -  04-02-21 @ 14:37  --------------------------------------------------------  IN: 0 mL / OUT: 250 mL / NET: -250 mL          HEENT: PERRL, conjunctiva clear, EOM's intact, non injected pharynx, no exudate  Neck: Supple, , no adenopathy, thyroid: not enlarged, no carotid bruit or JVD  Back: Symmetric, no  tenderness, no soft tissue tenderness  Lungs: better air entry today  Heart: Regular rate and rhythm, S1, S2 normal, no murmur,   Abdomen: Soft, non-tender, bowel sounds active , no hepatosplenomegaly  Extremities: no cyanosis or edema, no joint swelling  Skin: Skin color, texture normal, no rashes   Neurologic: Alert and oriented X3 , cranial nerves intact, sensory and motor normal,    04-02    138  |  104  |  34<H>  ----------------------------<  95  3.7   |  29  |  1.45<H>    Ca    10.1      02 Apr 2021 07:50                Echo:   Moderate (2+) mitral regurgitation is present.   The mitral valve leaflets appear thickened.   Mild mitral annular calcification is present.   Mild (1+) aortic regurgitation is present.   Mild aortic sclerosis is present with normal valvular opening.   Moderate to severe (3+) tricuspid valve regurgitation is present. Moderate   pulmonary hypertension.   The left atrium is mildly dilated.   Estimated left ventricular ejection fraction is 40-45 %.   Mild to moderately reduced left ventricular systolic function.   The right atrium appears moderately dilated.   Normal appearing right ventricle structure and function.   Pleural effusion - is present.       89 year old female w hx AFIB on AC,  remote Breast CA s/p L mastectomy, CONNOR Lung CA, HTN, primary PHP presented to ED by EMS c/o increased SOB        #Acute systolic  HF w BNP 11k from 12k  CXR + pleural effusions and PVC; exam +JVD, +rales on admission now resolved  Echo noted, multiple worrisome findings including the severe TR along with reduced EF and pt is unable to use ARB, ACEi due to tendency to develop hyperkalemia  fluid restrict 1200ml/day        #PAF: HR>120-130 ambulating  started on  Eliquis, pt and cardio agree  increase Metoprolol to 100 bid from 50 AM 100PM  Cardizem added      #CONNOR stage 3a adenocarcinoma lung  pt states in [id number] on last scan        #Remote breast CA L mastectomy  1. no BP on that side      #Goals of care  pt wants DNR/DNI      case d/w daughter, remains hospitalized due to AF RVR when ambulating

## 2021-04-02 NOTE — PROGRESS NOTE ADULT - SUBJECTIVE AND OBJECTIVE BOX
Patient is a 89y old  Female who presents with a chief complaint of acute resp failure, hypercarbic  acute CHF (01 Apr 2021 15:14)    3/29 Cardiology: 89 year old female with history of afib, permanent has been dyspneic for several months. was given albuterol and lasix. Over last several weeks has been feeling more dyspneic. Became severely dyspneic last several days. No definite pnd or orthopnea. no le edema. no chest pain or palps.  Followup HPI:    3/30 still dyspneic with exertion, oob to chair  3/31 feels better  4/1- no complaints. Denies shortness of breath.  4/2- no complaints. Metoprolol increased to 100 mg bid on 4/1 due to RVR in the 120s at times.    PAST MEDICAL & SURGICAL HISTORY:  Afib    Breast CA    HTN (hypertension)    No significant past surgical history        Review of symptoms:  Negative except for as noted in today's HPI.      MEDICATIONS  (STANDING):  amLODIPine   Tablet 2.5 milliGRAM(s) Oral daily  apixaban 2.5 milliGRAM(s) Oral every 12 hours  furosemide    Tablet 40 milliGRAM(s) Oral daily  metoprolol tartrate 100 milliGRAM(s) Oral daily  metoprolol tartrate 100 milliGRAM(s) Oral at bedtime    MEDICATIONS  (PRN):  acetaminophen   Tablet .. 650 milliGRAM(s) Oral every 6 hours PRN Temp greater or equal to 38C (100.4F), Mild Pain (1 - 3)          Vital Signs Last 24 Hrs  T(C): 36.2 (02 Apr 2021 04:24), Max: 37 (01 Apr 2021 17:45)  T(F): 97.2 (02 Apr 2021 04:24), Max: 98.6 (01 Apr 2021 17:45)  HR: 79 (02 Apr 2021 04:50) (76 - 122)  BP: 132/74 (02 Apr 2021 04:50) (106/48 - 154/86)  BP(mean): 88 (02 Apr 2021 04:50) (60 - 104)  RR: 24 (02 Apr 2021 04:50) (16 - 34)  SpO2: 93% (02 Apr 2021 04:50) (91% - 97%)    I&O's Summary    01 Apr 2021 07:01  -  02 Apr 2021 07:00  --------------------------------------------------------  IN: 0 mL / OUT: 1451 mL / NET: -1451 mL        PHYSICAL EXAM  General Appearance: comfortable  HEENT:   Neck:   Lungs: clear  Heart: 1/6 systolic murmur LSB  Abdomen:   Extremities: no edema  Neurologic:       INTERPRETATION OF TELEMETRY: Atrial fib with VR at rest  BPM    ECG:    LABS:                  Pro BNP  [id number] 03-31 @ 06:17  D Dimer  -- 03-31 @ 06:17  Pro BNP  [id number] 03-30 @ 06:44  D Dimer  -- 03-30 @ 06:44  Pro BNP  [id number] 03-28 @ 18:21  D Dimer  -- 03-28 @ 18:21              RADIOLOGY & ADDITIONAL STUDIES:    IMPRESSION:  1. Dilated cardiomyopathy  .CHF symptomatically improved.   2. Permanent AF with poor rate control.  3. HTN  PLAN:  Add diltiazem 30 mg tid and continue metoprolol 100 mg bid. Observe heart rate on cardiac monitor today. Continue oral furosemide.Avoid ACEI or ARB due to history of hyperkalemia.

## 2021-04-02 NOTE — PROGRESS NOTE ADULT - ASSESSMENT
89 y/o female known to [facility] [place] (Kathy Coyne and Corey Coronado) for Stage 3a lung adenocarcinoma (left upper lobe) s/p RT (5500 cGy over 20 fractions), history of Afib on Coumadin, HTN, Breast CA (age 55) , CAD admitted for SOB x past month. Dyspnea has worsened and patient was admitted for CHF.   - now found to have paroxysmal Atrial FIB - now started on eliquis as had previously   - CHF - currently undergoing active diuresis           - s IV diuresis - now planned to transition to oral LASIX              -  echo                    - Left Ventricle Estimated left ventricular ejection fraction is 40-45 %.   Mild to moderately reduced left ventricular systolic function.     - now with ongoing tachyarrythmia - cardio presently optimizing  - NSCLC - no known actionable alterations and given overall performance status has refrained from undergoing chemotherapy, given comorbidities also holding immunotherapy         will continue to follow     
  89 y/o female known to [facility] [place] (Kathy Coyne and Corey Coronado) for Stage 3a lung adenocarcinoma (left upper lobe) s/p RT (5500 cGy over 20 fractions), history of Afib on Coumadin, HTN, Breast CA (age 55) , CAD admitted for SOB x past month. Dyspnea has worsened and patient was admitted for CHF.   - now found to have paroxysmal Atrial FIB - now started on eliquis   - CHF - currently undergoing active diuresis           - still on IV diuresis              -  echo presently pending              FLUID BALANCE Total NET: -740 mL  - NSCLC - no known actionable alterations and given overall performance status has refrained from undergoing chemotherapy, given comorbidities also holding immunotherapy   patient planned for discharge in 24-48hours    will continue to follow     
 CHF rule out lv dysfunction or occult valvular disease  2. Permanent afib rate  reasonable for now  3. HTN    Plan:  cont lasix IV and bb,eliquis  await echo, if lv dysfunction is present would change ca blocker to acei or arb  
1. CHF rule out lv dysfunction or occult valvular disease  2. Permanent afib rate  reasonable for now  3. HTN    Plan:  cont lasix IV and bb  await echo, if lv dysfunction is present would change ca blocker to acei or arb  
  89 [place] [facility] [place]Kathy Coyne and Corey Coronado[place] for Stage 3a lung adenocarcinoma (left upper lobe) s/p RT (5500 cGy over 20 fractions), history of Afib on Coumadin, HTN, Breast CA (age 55) , CAD admitted for SOB x past month. Dyspnea has worsened and patient was admitted for CHF.   - now found to have paroxysmal Atrial FIB - now started on eliquis   - CHF - currently undergoing active diuresis           - s IV diuresis - now planned to transition to oral LASIX              -  echo                    - Left Ventricle Estimated left ventricular ejection fraction is 40-45 %.   Mild to moderately reduced left ventricular systolic function.      31 Mar 2021 07:01  -  01 Apr 2021 07:00  --------------------------------------------------------  IN:    Oral Fluid: 120 mL  Total IN: 120 mL    OUT:    Voided (mL): 750 mL  Total OUT: 750 mL    Total NET: -630 mL      01 Apr 2021 07:01  -  01 Apr 2021 15:16  --------------------------------------------------------  IN:  Total IN: 0 mL    OUT:    Voided (mL): 800 mL  Total OUT: 800 mL    Total NET: -800 mL        - NSCLC - no known actionable alterations and given overall performance status has refrained from undergoing chemotherapy, given comorbidities also holding immunotherapy   patient planned for discharge in 24-48hours    will continue to follow

## 2021-04-03 ENCOUNTER — TRANSCRIPTION ENCOUNTER (OUTPATIENT)
Age: 86
End: 2021-04-03

## 2021-04-03 VITALS
OXYGEN SATURATION: 98 % | HEART RATE: 86 BPM | SYSTOLIC BLOOD PRESSURE: 113 MMHG | RESPIRATION RATE: 23 BRPM | DIASTOLIC BLOOD PRESSURE: 96 MMHG

## 2021-04-03 PROCEDURE — 99239 HOSP IP/OBS DSCHRG MGMT >30: CPT

## 2021-04-03 RX ORDER — APIXABAN 2.5 MG/1
1 TABLET, FILM COATED ORAL
Qty: 60 | Refills: 0
Start: 2021-04-03 | End: 2021-05-02

## 2021-04-03 RX ORDER — METOPROLOL TARTRATE 50 MG
1 TABLET ORAL
Qty: 0 | Refills: 0 | DISCHARGE

## 2021-04-03 RX ORDER — DILTIAZEM HCL 120 MG
1 CAPSULE, EXT RELEASE 24 HR ORAL
Qty: 30 | Refills: 0
Start: 2021-04-03 | End: 2021-05-02

## 2021-04-03 RX ORDER — APIXABAN 2.5 MG/1
1 TABLET, FILM COATED ORAL
Qty: 0 | Refills: 0 | DISCHARGE
Start: 2021-04-03

## 2021-04-03 RX ORDER — AMLODIPINE BESYLATE 2.5 MG/1
1 TABLET ORAL
Qty: 0 | Refills: 0 | DISCHARGE

## 2021-04-03 RX ORDER — METOPROLOL TARTRATE 50 MG
0 TABLET ORAL
Qty: 0 | Refills: 0 | DISCHARGE

## 2021-04-03 RX ORDER — METOPROLOL TARTRATE 50 MG
1 TABLET ORAL
Qty: 60 | Refills: 0
Start: 2021-04-03 | End: 2021-05-02

## 2021-04-03 RX ADMIN — Medication 100 MILLIGRAM(S): at 09:28

## 2021-04-03 RX ADMIN — Medication 30 MILLIGRAM(S): at 06:21

## 2021-04-03 RX ADMIN — Medication 40 MILLIGRAM(S): at 06:21

## 2021-04-03 RX ADMIN — APIXABAN 2.5 MILLIGRAM(S): 2.5 TABLET, FILM COATED ORAL at 09:28

## 2021-04-03 NOTE — PROGRESS NOTE ADULT - SUBJECTIVE AND OBJECTIVE BOX
Patient is a 89y old  Female who presents with a chief complaint of acute resp failure, hypercarbic  acute CHF (02 Apr 2021 14:35)      3/29 Cardiology: 89 year old female with history of afib, permanent has been dyspneic for several months. was given albuterol and lasix. Over last several weeks has been feeling more dyspneic. Became severely dyspneic last several days. No definite pnd or orthopnea. no le edema. no chest pain or palps.  Followup HPI:    3/30 still dyspneic with exertion, oob to chair  3/31 feels better  4/1- no complaints. Denies shortness of breath.  4/2- no complaints. Metoprolol increased to 100 mg bid on 4/1 due to RVR in the 120s at times.  4/3- no complaints. No breathlessness with ambulation    PAST MEDICAL & SURGICAL HISTORY:  Afib    Breast CA    HTN (hypertension)    No significant past surgical history        Review of symptoms:  Negative except for as noted in today's HPI.      MEDICATIONS  (STANDING):  apixaban 2.5 milliGRAM(s) Oral every 12 hours  diltiazem    Tablet 30 milliGRAM(s) Oral three times a day  furosemide    Tablet 40 milliGRAM(s) Oral daily  metoprolol tartrate 100 milliGRAM(s) Oral daily  metoprolol tartrate 100 milliGRAM(s) Oral at bedtime  sodium chloride 0.45%. 500 milliLiter(s) (80 mL/Hr) IV Continuous <Continuous>    MEDICATIONS  (PRN):  acetaminophen   Tablet .. 650 milliGRAM(s) Oral every 6 hours PRN Temp greater or equal to 38C (100.4F), Mild Pain (1 - 3)          Vital Signs Last 24 Hrs  T(C): 36.2 (03 Apr 2021 04:10), Max: 36.9 (02 Apr 2021 16:21)  T(F): 97.2 (03 Apr 2021 04:10), Max: 98.5 (02 Apr 2021 16:21)  HR: 79 (03 Apr 2021 06:00) (59 - 127)  BP: 118/42 (03 Apr 2021 06:00) (104/59 - 168/63)  BP(mean): 63 (03 Apr 2021 06:00) (57 - 100)  RR: 17 (03 Apr 2021 06:00) (17 - 33)  SpO2: 94% (03 Apr 2021 06:00) (91% - 98%)    I&O's Summary    02 Apr 2021 07:01  -  03 Apr 2021 07:00  --------------------------------------------------------  IN: 0 mL / OUT: 450 mL / NET: -450 mL        PHYSICAL EXAM  General Appearance:comfortable  HEENT:   Neck:   Lungs: reduced BS both bases  Heart: 1/6 systolic murmur LLSB  Abdomen:   Extremities: no edema  Neurologic:       INTERPRETATION OF TELEMETRY: AF with VR 70s-90s at rest and up to 120s with ambulation.    ECG:    LABS:      04-02    138  |  104  |  34<H>  ----------------------------<  95  3.7   |  29  |  1.45<H>    Ca    10.1      02 Apr 2021 07:50            Pro BNP  [id number] 03-31 @ 06:17  D Dimer  -- 03-31 @ 06:17  Pro BNP  [id number] 03-30 @ 06:44  D Dimer  -- 03-30 @ 06:44  Pro BNP  [id number] 03-28 @ 18:21  D Dimer  -- 03-28 @ 18:21              RADIOLOGY & ADDITIONAL STUDIES:    IMPRESSION:    1. Dilated cardiomyopathy  .CHF symptomatically compensated,  2. Permanent AF with acceptable rate control at rest and with appropriate acceleration with ambulation.  3. HBP, controlled.  4. Adenocarcinoma CONNOR stage 3.  PLAN:  Continue  diltiazem, metoprolol, furosemide , Eliquis. Avoid ACEI or ARB due to history of hyperkalemia. Low sodium diet discussed. Suggest consider discharge.

## 2021-04-03 NOTE — DISCHARGE NOTE PROVIDER - NSDCCPCAREPLAN_GEN_ALL_CORE_FT
PRINCIPAL DISCHARGE DIAGNOSIS  Diagnosis: Acute on chronic congestive heart failure, unspecified heart failure type  Assessment and Plan of Treatment: stable; limit fluids to 1.2L/day; low salt diet; watxh weight if it increases call cardio; no more coumadin, Eliquis twice a day carlos replace it and you don't need blood test anymore      SECONDARY DISCHARGE DIAGNOSES  Diagnosis: Respiratory distress  Assessment and Plan of Treatment:     Diagnosis: SOB (shortness of breath)  Assessment and Plan of Treatment:

## 2021-04-03 NOTE — DISCHARGE NOTE NURSING/CASE MANAGEMENT/SOCIAL WORK - PATIENT PORTAL LINK FT
You can access the FollowMyHealth Patient Portal offered by [facility] by registering at the following website: [email or web address]. By joining [facility]’s FollowMyHealth portal, you will also be able to view your health information using other applications (apps) compatible with our system.

## 2021-04-03 NOTE — DISCHARGE NOTE PROVIDER - HOSPITAL COURSE
89 year old female w hx AFIB on AC,  remote Breast CA s/p L mastectomy, CONNOR Lung CA, HTN, primary PHP presented to ED by EMS c/o increased SOB  Was  adm for CHF exacerbation, on BIPAP and made full recovery. Feels much better,  minimal SOB but when she ambulates she is SOB and HR goes >120, AF  CXR shows marked improvement in the magnitude of the CHF but she now has marked RVR when she ambulates ( sometimes at rest too, but less frequent then when ambulating.        HEENT: PERRL, conjunctiva clear, EOM's intact, non injected pharynx, no exudate  Neck: Supple, , no adenopathy, thyroid: not enlarged, no carotid bruit or JVD  Back: Symmetric, no  tenderness, no soft tissue tenderness  Lungs: better air entry today  Heart: Regular rate and rhythm, S1, S2 normal, no murmur,   Abdomen: Soft, non-tender, bowel sounds active , no hepatosplenomegaly  Extremities: no cyanosis or edema, no joint swelling  Skin: Skin color, texture normal, no rashes   Neurologic: Alert and oriented X3 , cranial nerves intact, sensory and motor normal,                Echo:   Moderate (2+) mitral regurgitation is present.   The mitral valve leaflets appear thickened.   Mild mitral annular calcification is present.   Mild (1+) aortic regurgitation is present.   Mild aortic sclerosis is present with normal valvular opening.   Moderate to severe (3+) tricuspid valve regurgitation is present. Moderate   pulmonary hypertension.   The left atrium is mildly dilated.   Estimated left ventricular ejection fraction is 40-45 %.   Mild to moderately reduced left ventricular systolic function.   The right atrium appears moderately dilated.   Normal appearing right ventricle structure and function.   Pleural effusion - is present.       89 year old female w hx AFIB on AC,  remote Breast CA s/p L mastectomy, CONNOR Lung CA, HTN, primary PHP presented to ED by EMS c/o increased SOB        #Acute systolic  HF w BNP 11k from 12k  CXR + pleural effusions and PVC; exam +JVD, +rales on admission now resolved  Echo noted, multiple worrisome findings including the severe TR along with reduced EF and pt is unable to use ARB, ACEi due to tendency to develop hyperkalemia  fluid restrict 1200ml/day        #PAF:   started on  Eliquis, pt and cardio agree  increase Metoprolol to 100 bid from 50 AM 100PM  Cardizem added with improvement in the HR ambulating      #CONNOR stage 3a adenocarcinoma lung  pt states in [id number] on last scan        #Remote breast CA L mastectomy  1. no BP on that side      #Goals of care  DNR/DNI

## 2021-04-03 NOTE — PROGRESS NOTE ADULT - PROVIDER SPECIALTY LIST ADULT
Cardiology
Cardiology
Hospitalist
Cardiology
Hospitalist
Cardiology
Cardiology
Heme/Onc
Heme/Onc
Hospitalist
Heme/Onc

## 2021-04-03 NOTE — DISCHARGE NOTE PROVIDER - CARE PROVIDER_API CALL
Giuseppe Milton)  Cardiovascular Disease; Internal Medicine  [address] [place] [address]  Phone: [phone]  Fax: [phone]  Follow Up Time:

## 2021-04-03 NOTE — PROGRESS NOTE ADULT - NSICDXPILOT_GEN_ALL_CORE
[place]

## 2021-04-03 NOTE — PROGRESS NOTE ADULT - REASON FOR ADMISSION
acute resp failure, hypercarbic  acute CHF

## 2021-04-03 NOTE — DISCHARGE NOTE PROVIDER - NSDCMRMEDTOKEN_GEN_ALL_CORE_FT
apixaban 2.5 mg oral tablet: 1 tab(s) orally every 12 hours  apixaban 2.5 mg oral tablet: 1 tab(s) orally every 12 hours  dilTIAZem 120 mg/24 hours oral tablet, extended release: 1 tab(s) orally once a day  Lasix 20 mg oral tablet: 1 tab(s) orally once a day  Metoprolol Tartrate 100 mg oral tablet: 1 tab(s) orally 2 times a day

## 2021-04-07 ENCOUNTER — NON-APPOINTMENT (OUTPATIENT)
Age: 86
End: 2021-04-07

## 2021-04-07 DIAGNOSIS — I48.0 PAROXYSMAL ATRIAL FIBRILLATION: ICD-10-CM

## 2021-04-07 DIAGNOSIS — E21.0 PRIMARY HYPERPARATHYROIDISM: ICD-10-CM

## 2021-04-07 DIAGNOSIS — Z66 DO NOT RESUSCITATE: ICD-10-CM

## 2021-04-07 DIAGNOSIS — I50.23 ACUTE ON CHRONIC SYSTOLIC (CONGESTIVE) HEART FAILURE: ICD-10-CM

## 2021-04-07 DIAGNOSIS — I11.0 HYPERTENSIVE HEART DISEASE WITH HEART FAILURE: ICD-10-CM

## 2021-04-07 DIAGNOSIS — C34.12 MALIGNANT NEOPLASM OF UPPER LOBE, LEFT BRONCHUS OR LUNG: ICD-10-CM

## 2021-04-07 DIAGNOSIS — Z85.3 PERSONAL HISTORY OF MALIGNANT NEOPLASM OF BREAST: ICD-10-CM

## 2021-04-07 DIAGNOSIS — Z79.01 LONG TERM (CURRENT) USE OF ANTICOAGULANTS: ICD-10-CM

## 2021-04-07 DIAGNOSIS — I27.20 PULMONARY HYPERTENSION, UNSPECIFIED: ICD-10-CM

## 2021-04-07 DIAGNOSIS — Z90.12 ACQUIRED ABSENCE OF LEFT BREAST AND NIPPLE: ICD-10-CM

## 2021-04-07 DIAGNOSIS — Z92.3 PERSONAL HISTORY OF IRRADIATION: ICD-10-CM

## 2021-04-07 DIAGNOSIS — J96.02 ACUTE RESPIRATORY FAILURE WITH HYPERCAPNIA: ICD-10-CM

## 2021-04-07 DIAGNOSIS — I08.3 COMBINED RHEUMATIC DISORDERS OF MITRAL, AORTIC AND TRICUSPID VALVES: ICD-10-CM

## 2021-04-07 DIAGNOSIS — I42.0 DILATED CARDIOMYOPATHY: ICD-10-CM

## 2021-04-07 DIAGNOSIS — Z88.0 ALLERGY STATUS TO PENICILLIN: ICD-10-CM

## 2021-04-07 DIAGNOSIS — Z88.1 ALLERGY STATUS TO OTHER ANTIBIOTIC AGENTS STATUS: ICD-10-CM

## 2021-04-08 ENCOUNTER — APPOINTMENT (OUTPATIENT)
Dept: CARE COORDINATION | Facility: HOME HEALTH | Age: 86
End: 2021-04-08
Payer: MEDICARE

## 2021-04-08 VITALS
RESPIRATION RATE: 18 BRPM | HEART RATE: 76 BPM | OXYGEN SATURATION: 96 % | BODY MASS INDEX: 20.7 KG/M2 | SYSTOLIC BLOOD PRESSURE: 138 MMHG | WEIGHT: 115 LBS | DIASTOLIC BLOOD PRESSURE: 68 MMHG

## 2021-04-08 DIAGNOSIS — I48.91 UNSPECIFIED ATRIAL FIBRILLATION: ICD-10-CM

## 2021-04-08 DIAGNOSIS — Z85.3 PERSONAL HISTORY OF MALIGNANT NEOPLASM OF BREAST: ICD-10-CM

## 2021-04-08 DIAGNOSIS — C34.12 MALIGNANT NEOPLASM OF UPPER LOBE, LEFT BRONCHUS OR LUNG: ICD-10-CM

## 2021-04-08 DIAGNOSIS — I50.9 HEART FAILURE, UNSPECIFIED: ICD-10-CM

## 2021-04-08 DIAGNOSIS — I10 ESSENTIAL (PRIMARY) HYPERTENSION: ICD-10-CM

## 2021-04-08 PROCEDURE — 99496 TRANSJ CARE MGMT HIGH F2F 7D: CPT

## 2021-04-08 RX ORDER — WARFARIN 6 MG/1
6 TABLET ORAL
Qty: 90 | Refills: 0 | Status: DISCONTINUED | COMMUNITY
Start: 2017-08-28 | End: 2021-04-08

## 2021-04-08 RX ORDER — AMLODIPINE BESYLATE 5 MG/1
5 TABLET ORAL
Qty: 90 | Refills: 0 | Status: DISCONTINUED | COMMUNITY
Start: 2017-10-30 | End: 2021-04-08

## 2021-04-08 RX ORDER — METOPROLOL SUCCINATE 50 MG/1
50 TABLET, EXTENDED RELEASE ORAL
Qty: 90 | Refills: 0 | Status: DISCONTINUED | COMMUNITY
Start: 2017-06-19 | End: 2021-04-08

## 2021-04-08 RX ORDER — CLOBETASOL PROPIONATE 0.5 MG/G
0.05 AEROSOL, FOAM TOPICAL
Qty: 1 | Refills: 6 | Status: ACTIVE | COMMUNITY
Start: 2017-12-15

## 2021-04-08 RX ORDER — APIXABAN 2.5 MG/1
2.5 TABLET, FILM COATED ORAL
Refills: 0 | Status: ACTIVE | COMMUNITY
Start: 2021-04-08

## 2021-04-08 RX ORDER — FUROSEMIDE 20 MG/1
20 TABLET ORAL DAILY
Refills: 0 | Status: ACTIVE | COMMUNITY
Start: 2021-04-08

## 2021-04-08 RX ORDER — METOPROLOL TARTRATE 100 MG/1
100 TABLET, FILM COATED ORAL
Refills: 0 | Status: ACTIVE | COMMUNITY
Start: 2021-04-08

## 2021-04-08 RX ORDER — CALCIPOTRIENE 50 UG/G
0.01 CREAM TOPICAL
Qty: 1 | Refills: 5 | Status: ACTIVE | COMMUNITY
Start: 2017-12-15

## 2021-04-08 RX ORDER — DILTIAZEM HYDROCHLORIDE 120 MG/1
120 CAPSULE, EXTENDED RELEASE ORAL DAILY
Refills: 0 | Status: ACTIVE | COMMUNITY
Start: 2021-04-08

## 2021-04-08 NOTE — PLAN
[FreeTextEntry1] : A/P:\par \par 1.CHF\par s/p hospitalization for exacerbation systolic HF, BNP 11K. Placed on bipap, diuressed with good results.\par Con't lasix, metoprolol.\par Enforced with patient need for daily weights. Advised to call for an increase of greater than 2 lbs in a day or 5 pounds in a week.\par Adhere to low salt diet and educated patient on foods that should be  avoided such as processed or fast food.\par Limit fluids to 1 liter a day which is 4-5 glasses.\par Continue medications as ordered. \par Follow up with Cardiologist as scheduled and prn\par \par 2. Afib:\par Controlled rate, con't metoprolol, diltiazem. Eliquis for AC\par \par 3. HTN:\par BP stable, con't dilitizem, metoprolol\par \par 4. Stage 3A CONNOR adenocarcinoma:\par s/p RT at [facility], in remission per patient.\par F/U per [facility]

## 2021-04-08 NOTE — PHYSICAL EXAM
[No Acute Distress] : no acute distress [Well Nourished] : well nourished [Well Developed] : well developed [Well-Appearing] : well-appearing [Normal Sclera/Conjunctiva] : normal sclera/conjunctiva [Normal Outer Ear/Nose] : the outer ears and nose were normal in appearance [Normal Oropharynx] : the oropharynx was normal [Supple] : supple [No Respiratory Distress] : no respiratory distress  [Clear to Auscultation] : lungs were clear to auscultation bilaterally [No Accessory Muscle Use] : no accessory muscle use [Pedal Pulses Present] : the pedal pulses are present [No Edema] : there was no peripheral edema [No Extremity Clubbing/Cyanosis] : no extremity clubbing/cyanosis [Soft] : abdomen soft [Non Tender] : non-tender [Non-distended] : non-distended [Normal Bowel Sounds] : normal bowel sounds [No Spinal Tenderness] : no spinal tenderness [Grossly Normal Strength/Tone] : grossly normal strength/tone [No Rash] : no rash [Coordination Grossly Intact] : coordination grossly intact [No Focal Deficits] : no focal deficits [Normal Affect] : the affect was normal [Alert and Oriented x3] : oriented to person, place, and time [Normal Insight/Judgement] : insight and judgment were intact [de-identified] : Elderly female, A&O x 3, does not appear stated age [de-identified] : diminished at bases [de-identified] : irregular afib [de-identified] : s/p L mastectomy [de-identified] : CN 2-12 grossly intact

## 2021-04-08 NOTE — ASSESSMENT
[FreeTextEntry1] : Pt is being seen after discharge home from [facility]. She was admitted on 03/28/2021 for SO3 and discharged home on 04/08/2021. Discharge medications were reviewed and reconciled with the current medication list and medications in home.\par \par CC: f/u s/p hospitalization for CHF exacerbation\par Pt is seen at home s/p recent admission for CHF. \par HPI:\par Pt Is a 89 y/o female enrolled in the [facility] program seen at home s/p a recent admission for CHF admitted from 3/28-4/3. Pt was contacted by TCM and med rec was done within 48 hours of DC. Upon arrival to patient's home sitting with her dtg in her kitchen, A&OO x 3 NAD PMH:  AFIB on AC,  remote Breast CA s/p L mastectomy, CONNOR Lung CA (in remission s/p RT @ [facility]), HTN, primary PHP presented to ED by EMS c/o increased SOB\par Was  admitted for CHF exacerbation, placed on BIPAP, diureses and made full recovery. She denies CP, SOB, headche/dizziness, abd discomort, pain or difficulty with bowel or bladder. Saw Dr. Milton earlier this week, no med changes. [facility] home services in the home.

## 2021-04-08 NOTE — HISTORY OF PRESENT ILLNESS
[Post-hospitalization from ___ Hospital] : Post-hospitalization from [unfilled] Hospital [Admitted on: ___] : The patient was admitted on [unfilled] [Discharged on ___] : discharged on [unfilled] [Discharge Med List] : discharge medication list [Med Reconciliation] : medication reconciliation has been completed [Patient Contacted By: ____] : and contacted by [unfilled] [FreeTextEntry2] : FROM [facility] PROVIDER NOTE:\par Discharge Note Provider [Charted Location: [facility] 336 01] [Authored: 03-Apr-2021 09:21]- for Visit: [id number], Complete, Entered, Signed in Full, General\par \par \par  Hospital Course:\par Discharge Date	03-Apr-2021\par Admission Date	28-Mar-2021 21:35\par Reason for Admission	acute resp failure, hypercarbic\par acute CHF\par Hospital Course	\par 89 year old female w hx AFIB on AC,  remote Breast CA s/p L mastectomy, CONNOR Lung CA, HTN, primary PHP presented to ED by EMS c/o increased SOB\par Was  adm for CHF exacerbation, on BIPAP and made full recovery. Feels much better,  minimal SOB but when she ambulates she is SOB and HR goes >120, AF\par CXR shows marked improvement in the magnitude of the CHF but she now has marked RVR when she ambulates ( sometimes at rest too, but less frequent then when ambulating.\par \par \par \par HEENT: PERRL, conjunctiva clear, EOM's intact, non injected pharynx, no exudate\par Neck: Supple, , no adenopathy, thyroid: not enlarged, no carotid bruit or JVD\par Back: Symmetric, no  tenderness, no soft tissue tenderness\par Lungs: better air entry today\par Heart: Regular rate and rhythm, S1, S2 normal, no murmur, \par Abdomen: Soft, non-tender, bowel sounds active , no hepatosplenomegaly\par Extremities: no cyanosis or edema, no joint swelling\par Skin: Skin color, texture normal, no rashes \par Neurologic: Alert and oriented X3 , cranial nerves intact, sensory and motor normal,\par \par \par         \par Echo:\par  Moderate (2+) mitral regurgitation is present.\par  The mitral valve leaflets appear thickened.\par  Mild mitral annular calcification is present.\par  Mild (1+) aortic regurgitation is present.\par  Mild aortic sclerosis is present with normal valvular opening.\par  Moderate to severe (3+) tricuspid valve regurgitation is present. Moderate\par  pulmonary hypertension.\par  The left atrium is mildly dilated.\par  Estimated left ventricular ejection fraction is 40-45 %.\par  Mild to moderately reduced left ventricular systolic function.\par  The right atrium appears moderately dilated.\par  Normal appearing right ventricle structure and function.\par  Pleural effusion - is present.\par  \par \par 89 year old female w hx AFIB on AC,  remote Breast CA s/p L mastectomy, CONNOR Lung CA, HTN, primary PHP presented to ED by EMS c/o increased SOB\par \par \par \par #Acute systolic  HF w BNP 11k from 12k\par CXR + pleural effusions and PVC; exam +JVD, +rales on admission now resolved\par Echo noted, multiple worrisome findings including the severe TR along with reduced EF and pt is unable to use ARB, ACEi due to tendency to develop hyperkalemia\par fluid restrict 1200ml/day\par \par \par \par #PAF: \par started on  Eliquis, pt and cardio agree\par increase Metoprolol to 100 bid from 50 AM 100PM\par Cardizem added with improvement in the HR ambulating\par \par \par #CONNOR stage 3a adenocarcinoma lung\par pt states in [id number] on last scan\par \par \par \par #Remote breast CA L mastectomy\par 1. no BP on that side\par \par \par #Goals of care\par DNR/DNI\par \par  Med Reconciliation:\par Medication Reconciliation Status	Admission Reconciliation is Completed\par Discharge Reconciliation is Completed\par Discharge Medications	apixaban 2.5 mg oral tablet: 1 tab(s) orally every 12 hours\par apixaban 2.5 mg oral tablet: 1 tab(s) orally every 12 hours\par dilTIAZem 120 mg/24 hours oral tablet, extended release: 1 tab(s) orally once a day\par Lasix 20 mg oral tablet: 1 tab(s) orally once a day\par Metoprolol Tartrate 100 mg oral tablet: 1 tab(s) orally 2 times a day\par ,\par ,\par \par  Care Plan/Procedures:\par Goal(s)	To get better and follow your care plan as instructed.\par Discharge Diagnoses, Assessment and Plan of Treatment	PRINCIPAL DISCHARGE DIAGNOSIS\par Diagnosis: Acute on chronic congestive heart failure, unspecified heart failure type\par Assessment and Plan of Treatment: stable; limit fluids to 1.2L/day; low salt diet; watxh weight if it increases call cardio; no more coumadin, Eliquis twice a day carlos replace it and you don't need blood test anymore\par \par \par SECONDARY DISCHARGE DIAGNOSES\par Diagnosis: Respiratory distress\par Assessment and Plan of Treatment: \par \par Diagnosis: SOB (shortness of breath)\par Assessment and Plan of Treatment:\par \par  Follow Up:\par Care Providers for Follow up (PCP/Outpatient Provider)	Giuseppe Milton)\par Cardiovascular Disease; Internal Medicine\par [address]\[place] [address]\par Phone: [phone]\par Fax: [phone]\par Follow Up Time:\par \par  Quality Measures:\par Does the patient have difficulty running errands alone like visiting a doctor’s office or shopping?	Yes\par Does the patient have difficulty climbing stairs?	Yes\par Hospice Patient	No\par Cognition: The patient has	No difficulties\par Does the patient have a principal diagnosis of ischemic stroke, hemorrhagic stroke, or TIA?	No\par Does the patient have a principal diagnosis of Acute Myocardial Infarction?	No\par Has the patient had a Percutaneous Coronary Intervention?	No\par Tobacco Usage Within the Last Year	No\par \par  Home Health:\par Discharged with Home Health Care Services?	Yes\par Face-To-Face Contact	As certified below, I, or a nurse practitioner or physician assistant working with me, had a face-to-face encounter that meets the physician face-to-face encounter requirements.\par Need for Skilled Services	Medication teaching and assessment\par Based on the above findings, the following intermittent skilled services are medically necessary home health services:	Nursing\par Home Bound Status	Ataxic gait  Fall risk\par Leaving Home is Contraindicated...\par The patient has a condition which confines the patient to the home or such that leaving his/her home is medically contraindicated:	Activity restrictions due to illness/surgery\par Attending Certification	My signature below certifies that the above stated patient is homebound and upon completion of the Face-To-Face encounter, has the need for intermittent skilled nursing, physical therapy and/or speech or occupational therapy services in their home for their current diagnosis as outlined in their initial plan of care. These services will continue to be monitored by myself or another physician.\par Encounter Date	03-Apr-2021\par \par  Document Complete:\par Care Provider Seen in Hospital	Kalie Snyder\par Physician Section Complete	This document is complete and the patient is ready for discharge.\par For questions about your prescriptions, please call:	[phone]\par Is this contact telephone number correct?	Yes\par \par \par \par Electronic Signatures:\par Kalie Snyder)  (Signed 03-Apr-2021 09:30)\par 	Authored: Hospital Course, Med Reconciliation, Care Plan/Procedures, Follow Up, Quality Measures, Home Health, Document Complete\par \par \par Last Updated: 03-Apr-2021 09:30 by Kalie Snyder)\par \par \par

## 2021-04-21 ENCOUNTER — NON-APPOINTMENT (OUTPATIENT)
Age: 86
End: 2021-04-21

## 2021-04-29 ENCOUNTER — NON-APPOINTMENT (OUTPATIENT)
Age: 86
End: 2021-04-29

## 2021-05-04 ENCOUNTER — NON-APPOINTMENT (OUTPATIENT)
Age: 86
End: 2021-05-04

## 2021-09-07 ENCOUNTER — EMERGENCY (EMERGENCY)
Facility: HOSPITAL | Age: 86
LOS: 0 days | Discharge: HOME | End: 2021-09-08
Attending: EMERGENCY MEDICINE
Payer: MEDICARE

## 2021-09-07 VITALS
HEIGHT: 63 IN | OXYGEN SATURATION: 91 % | HEART RATE: 104 BPM | TEMPERATURE: 98 F | SYSTOLIC BLOOD PRESSURE: 184 MMHG | RESPIRATION RATE: 16 BRPM | DIASTOLIC BLOOD PRESSURE: 101 MMHG | WEIGHT: 151.9 LBS

## 2021-09-07 VITALS
RESPIRATION RATE: 18 BRPM | OXYGEN SATURATION: 96 % | SYSTOLIC BLOOD PRESSURE: 160 MMHG | TEMPERATURE: 98 F | HEART RATE: 79 BPM | DIASTOLIC BLOOD PRESSURE: 89 MMHG

## 2021-09-07 DIAGNOSIS — Z88.0 ALLERGY STATUS TO PENICILLIN: ICD-10-CM

## 2021-09-07 DIAGNOSIS — R51.9 HEADACHE, UNSPECIFIED: ICD-10-CM

## 2021-09-07 DIAGNOSIS — Z79.899 OTHER LONG TERM (CURRENT) DRUG THERAPY: ICD-10-CM

## 2021-09-07 DIAGNOSIS — Z79.01 LONG TERM (CURRENT) USE OF ANTICOAGULANTS: ICD-10-CM

## 2021-09-07 DIAGNOSIS — S09.90XA UNSPECIFIED INJURY OF HEAD, INITIAL ENCOUNTER: ICD-10-CM

## 2021-09-07 DIAGNOSIS — S00.81XA ABRASION OF OTHER PART OF HEAD, INITIAL ENCOUNTER: ICD-10-CM

## 2021-09-07 DIAGNOSIS — Z88.1 ALLERGY STATUS TO OTHER ANTIBIOTIC AGENTS STATUS: ICD-10-CM

## 2021-09-07 DIAGNOSIS — C50.919 MALIGNANT NEOPLASM OF UNSPECIFIED SITE OF UNSPECIFIED FEMALE BREAST: ICD-10-CM

## 2021-09-07 DIAGNOSIS — I48.91 UNSPECIFIED ATRIAL FIBRILLATION: ICD-10-CM
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
## 2021-09-07 DIAGNOSIS — S00.93XA CONTUSION OF UNSPECIFIED PART OF HEAD, INITIAL ENCOUNTER: ICD-10-CM
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
## 2021-09-07 DIAGNOSIS — I10 ESSENTIAL (PRIMARY) HYPERTENSION: ICD-10-CM

## 2021-09-07 PROCEDURE — G1004: CPT

## 2021-09-07 PROCEDURE — 72125 CT NECK SPINE W/O DYE: CPT

## 2021-09-07 PROCEDURE — 70450 CT HEAD/BRAIN W/O DYE: CPT

## 2021-09-07 PROCEDURE — 72125 CT NECK SPINE W/O DYE: CPT | Mod: 26,MG

## 2021-09-07 PROCEDURE — 70450 CT HEAD/BRAIN W/O DYE: CPT | Mod: 26,MG

## 2021-09-07 PROCEDURE — 99284 EMERGENCY DEPT VISIT MOD MDM: CPT

## 2021-09-07 PROCEDURE — 99284 EMERGENCY DEPT VISIT MOD MDM: CPT | Mod: 25

## 2021-09-07 NOTE — ED PROVIDER NOTE - PATIENT PORTAL LINK FT
You can access the FollowMyHealth Patient Portal offered by [facility] by registering at the following website: [email or web address]. By joining [email or web address]’s FollowMyHealth portal, you will also be able to view your health information using other applications (apps) compatible with our system.

## 2021-09-07 NOTE — ED PROVIDER NOTE - OBJECTIVE STATEMENT
89 y/o female in ED c/o right frontal HA x 1 hr s/p trip and fall tonight.   pt states that she walked from her hallway into her room in the dark and thought that she was at her bed, went to lay on her bed but missed bed and fell onto floor hitting head on floor.   pt denies any LOC, neck pain, cp, sob, n/v/d/abd pain.   states on Eliquis.

## 2021-09-07 NOTE — ED ADULT NURSE NOTE - NSIMPLEMENTINTERV_GEN_ALL_ED
Implemented All Fall with Harm Risk Interventions:  [place] to call system. Call bell, personal items and telephone within reach. Instruct patient to call for assistance. Room bathroom lighting operational. Non-slip footwear when patient is off stretcher. Physically safe environment: no spills, clutter or unnecessary equipment. Stretcher in lowest position, wheels locked, appropriate side rails in place. Provide visual cue, wrist band, yellow gown, etc. Monitor gait and stability. Monitor for mental status changes and reorient to person, place, and time. Review medications for side effects contributing to fall risk. Reinforce activity limits and safety measures with patient and family. Provide visual clues: red socks.

## 2021-09-07 NOTE — ED PROVIDER NOTE - PROGRESS NOTE DETAILS
pt told of results.   states lives alone and that her daughter can pick her up.   pt states has aides during the day.

## 2021-09-07 NOTE — ED PROVIDER NOTE - CARE PLAN
1 Principal Discharge DX:	Hematoma  Secondary Diagnosis:	Head trauma  Secondary Diagnosis:	Abrasion

## 2021-09-07 NOTE — ED ADULT TRIAGE NOTE - CHIEF COMPLAINT QUOTE
patient A&Ox4 brought in by EMS from home s/p trip and fall from standing height, large contusion above right eyebrow with abrasion noted to lip.  patient denies LOC, on eliquis.  As per Dr. Pizarro, Neuro Alert called at 2222, patient sent directly to CT scan.

## 2021-09-07 NOTE — ED PROVIDER NOTE - SKIN COLOR
superficial abrasion to right upper outer lip.   large hematoma over right forehead and periorbital region.

## 2021-09-07 NOTE — ED ADULT NURSE NOTE - OBJECTIVE STATEMENT
patient axox3, s/p trip and fall. patient states she was trying to get back into bed with the lights off and accidentally tripped and fell. large contusion above right eyebrow with abrasion noted to lip. patient c/o right-sided head pain. patient denies LOC, on eliquis. patient denies vision changes, n/v/d, fever, chills, cough, chest pain, SOB.

## 2021-09-07 NOTE — ED PROVIDER NOTE - NSFOLLOWUPINSTRUCTIONS_ED_ALL_ED_FT
please follow up with your doctor in 2-3 days.   take tylenol for pain.   apply ice to hematoma.   return to ED for cahnge in mental status, vomiting or any concerns

## 2021-10-04 NOTE — ED PROVIDER NOTE - PRO INTERPRETER NEED 2
[Follow-Up] : a follow-up visit [Home] : at home, [unfilled] , at the time of the visit. [Medical Office: ([facility])___] : at the medical office located in  [Verbal consent obtained from patient] : the patient, [unfilled] [FreeTextEntry2] : narcolepsy English

## 2021-10-06 PROBLEM — I10 ESSENTIAL HYPERTENSION: Status: ACTIVE | Noted: 2021-04-08

## 2021-10-26 NOTE — ED ADULT NURSE NOTE - NS ED NURSE REPORT GIVEN TO FT
Left detailed voice message regarding below information. Patient to call back with questions.    BEULAH RN

## 2022-05-05 NOTE — DISCHARGE NOTE NURSING/CASE MANAGEMENT/SOCIAL WORK - NSDCPNINST_GEN_ALL_CORE
Call MD for any increase in pain, further malissa red blood in stool, black stool. 
Moderna dose 1, 2, and 3

## 2023-05-08 NOTE — PATIENT PROFILE ADULT - NSPROMEDSADMININFO_GEN_A_NUR
Patient scheduled a follow up for her diabetes 05/24/23 at 2:20 pm asking if Dr. Olga Benitez can order labs no concerns

## 2023-05-19 NOTE — ED ADULT NURSE NOTE - NEURO ASSESSMENT
[None] : The patient complains of no symptoms [Palpitations] : no palpitations [SOB] : no dyspnea [Syncope] : no syncope [Erythema at Site] : no erythema at device site [Swelling at Site] : no swelling at device site [de-identified] : 70 years old female with AICD for ICM presents for a routine visit. Pt s/p gen change and addition of RA lead for bradycardia and discrimination of VT\par \par Denies CP/SOB/palpitations \par No dizziness or syncope \par Denies AICD shocks \par \par Had a mechanical fall 5/4/22  - - -

## 2024-01-28 NOTE — ED PROVIDER NOTE - CONTEXT
Interval History: BP not controlled, adjusting medication as patient has headache with elevated readings    Review of Systems  Objective:     Vital Signs (Most Recent):  Temp: 97.8 °F (36.6 °C) (01/27/24 1629)  Pulse: 81 (01/27/24 1700)  Resp: 18 (01/27/24 1629)  BP: (!) 170/93 (01/27/24 1629)  SpO2: 97 % (01/27/24 1629) Vital Signs (24h Range):  Temp:  [97.7 °F (36.5 °C)-98.5 °F (36.9 °C)] 97.8 °F (36.6 °C)  Pulse:  [58-87] 81  Resp:  [16-23] 18  SpO2:  [95 %-99 %] 97 %  BP: (147-170)/(82-93) 170/93     Weight: 98.9 kg (218 lb 0.6 oz)  Body mass index is 29.57 kg/m².    Intake/Output Summary (Last 24 hours) at 1/27/2024 1859  Last data filed at 1/27/2024 1645  Gross per 24 hour   Intake 820 ml   Output 1350 ml   Net -530 ml         Physical Exam  HENT:      Head: Normocephalic and atraumatic.   Cardiovascular:      Rate and Rhythm: Normal rate and regular rhythm.      Heart sounds: No murmur heard.  Pulmonary:      Effort: Pulmonary effort is normal. No respiratory distress.      Breath sounds: Normal breath sounds. No wheezing.   Abdominal:      General: Bowel sounds are normal. There is no distension.      Palpations: Abdomen is soft.      Tenderness: There is no abdominal tenderness.   Musculoskeletal:         General: No swelling.   Skin:     General: Skin is warm and dry.   Neurological:      Mental Status: He is alert and oriented to person, place, and time. Mental status is at baseline.             Significant Labs: All pertinent labs within the past 24 hours have been reviewed.    CMP:   Recent Labs   Lab 01/26/24  0654 01/27/24  0315    137   K 3.6 3.6    106   CO2 27 20*    102   BUN 22 24*   CREATININE 1.6* 1.5*   CALCIUM 9.4 9.4   PROT 6.7 6.7   ALBUMIN 3.3* 3.3*   BILITOT 0.7 0.6   ALKPHOS 66 65   AST 23 27   ALT 33 34   ANIONGAP 5* 11       Significant Imaging: I have reviewed all pertinent imaging results/findings within the past 24 hours.   sick contacts/coughing

## 2024-09-27 NOTE — ED PROVIDER NOTE - EYES, MLM
No care due was identified.  [facility] Embedded Care Due Messages. Reference number: [id number].   9/26/2024 10:58:08 PM CDT   Clear bilaterally, pupils equal, round and reactive to light.

## 2025-01-14 NOTE — ED ADULT TRIAGE NOTE - CADM TRG TX PRIOR TO ARRIVAL
Specialty Care Management Heart Failure Program  The Heart Failure program is a nursing care model with a team of telephonic, registered nurses that focuses on the whole person needs.       Situation:    Patient is enrolled in Specialty Care Heart Failure services to optimize self-management of heart failure     Outreach:  for RPM alert answered 'yes' to heart failure symptom questionnaire.    Assessment: Attempted to contact patient, unable to leave VM.    Program Plan:   Reinforce teaching on next outreach:  reviewing positive steps towards self-management     Next Follow: Within one week and as needed for RPM alerts/concerns.      See hyperlinks within encounter for full documentation.  
ice

## 2025-03-12 NOTE — ED ADULT NURSE NOTE - CCCP TRG CHIEF CMPLNT
shortness of breath
Alert-The patient is alert, awake and responds to voice. The patient is oriented to time, place, and person. The triage nurse is able to obtain subjective information.